# Patient Record
Sex: FEMALE | Race: WHITE | NOT HISPANIC OR LATINO | Employment: FULL TIME | URBAN - METROPOLITAN AREA
[De-identification: names, ages, dates, MRNs, and addresses within clinical notes are randomized per-mention and may not be internally consistent; named-entity substitution may affect disease eponyms.]

---
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
## 2018-08-01 ENCOUNTER — HOSPITAL ENCOUNTER (OUTPATIENT)
Dept: MRI IMAGING | Facility: HOSPITAL | Age: 18
Discharge: HOME | End: 2018-08-01
Attending: FAMILY MEDICINE | Admitting: FAMILY MEDICINE

## 2019-06-10 ENCOUNTER — CONVERSION ENCOUNTER (OUTPATIENT)
Dept: FAMILY MEDICINE CLINIC | Facility: CLINIC | Age: 19
End: 2019-06-10

## 2019-06-12 VITALS
RESPIRATION RATE: 16 BRPM | HEART RATE: 84 BPM | BODY MASS INDEX: 28.35 KG/M2 | SYSTOLIC BLOOD PRESSURE: 119 MMHG | WEIGHT: 155 LBS | OXYGEN SATURATION: 98 % | DIASTOLIC BLOOD PRESSURE: 78 MMHG
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
## 2019-09-05 ENCOUNTER — OFFICE VISIT (OUTPATIENT)
Dept: FAMILY MEDICINE CLINIC | Facility: CLINIC | Age: 19
End: 2019-09-05

## 2019-09-05 VITALS
WEIGHT: 161 LBS | HEIGHT: 62 IN | OXYGEN SATURATION: 98 % | SYSTOLIC BLOOD PRESSURE: 123 MMHG | TEMPERATURE: 97.4 F | HEART RATE: 90 BPM | BODY MASS INDEX: 29.63 KG/M2 | DIASTOLIC BLOOD PRESSURE: 81 MMHG | RESPIRATION RATE: 16 BRPM

## 2019-09-05 DIAGNOSIS — M25.562 ACUTE PAIN OF LEFT KNEE: Primary | ICD-10-CM

## 2019-09-05 PROBLEM — K76.0 FATTY LIVER: Status: ACTIVE | Noted: 2017-03-20

## 2019-09-05 PROBLEM — G43.909 HEADACHE, MIGRAINE: Status: ACTIVE | Noted: 2018-08-30

## 2019-09-05 PROCEDURE — 99213 OFFICE O/P EST LOW 20 MIN: CPT | Performed by: FAMILY MEDICINE

## 2019-09-05 RX ORDER — FEXOFENADINE HCL 180 MG/1
TABLET ORAL
COMMUNITY
Start: 2017-06-14 | End: 2021-12-09 | Stop reason: SDUPTHER

## 2019-09-05 RX ORDER — FLUTICASONE PROPIONATE 50 MCG
SPRAY, SUSPENSION (ML) NASAL
COMMUNITY
Start: 2016-01-07 | End: 2021-03-04 | Stop reason: SDUPTHER

## 2019-09-05 RX ORDER — MONTELUKAST SODIUM 10 MG/1
TABLET ORAL
COMMUNITY
Start: 2017-06-14 | End: 2021-07-09

## 2019-09-05 RX ORDER — PANTOPRAZOLE SODIUM 40 MG/1
TABLET, DELAYED RELEASE ORAL EVERY 24 HOURS
COMMUNITY
Start: 2018-12-05 | End: 2021-07-09

## 2019-09-05 NOTE — PROGRESS NOTES
Subjective   Chief Complaint   Patient presents with   • Knee Pain     Left knee injury 1 week ago     Alpesh Ocasio is a 19 y.o. female.     Patient Care Team:  Joy Meadows MD as PCP - General    She is coming in today to follow-up on her left knee injury and recent urgent care visit.  She reports that about a week ago while walking she tripped and fell directly on the left knee.  She developed instant pain and swelling as well as a big bruise.  The same day she was seen at urgent care and had x-ray done and was told everything looked fine and there was no fracture.  She has been taking some over-the-counter ibuprofen as needed and that helps.  Her knee is slowly getting better, however she still has a pretty big bruise and she is concerned and wants that to be checked.  She denies any swelling.  Her pain gets especially worse when she stays on her feet longer.  She denies any weakness, numbness, or tingling.  No new injuries reported.         The following portions of the patient's history were reviewed and updated as appropriate: allergies, current medications, past family history, past medical history, past social history, past surgical history and problem list.  Past Medical History:   Diagnosis Date   • Allergic rhinitis    • Arachnoid cyst    • Concussion    • Fatty liver    • GERD (gastroesophageal reflux disease)      Past Surgical History:   Procedure Laterality Date   • CYST REMOVAL      neck     The patient has a family history of  Family History   Problem Relation Age of Onset   • Diabetes Maternal Grandmother    • Diabetes Maternal Grandfather    • Lung cancer Paternal Grandfather      Social History     Socioeconomic History   • Marital status: Single     Spouse name: Not on file   • Number of children: Not on file   • Years of education: Not on file   • Highest education level: Not on file   Tobacco Use   • Smoking status: Never Smoker   • Smokeless tobacco: Never Used   Substance and Sexual  "Activity   • Alcohol use: Yes     Comment: occasionally   • Drug use: No   • Sexual activity: Defer       Review of Systems   Constitutional: Negative for fatigue and fever.   Musculoskeletal: Positive for arthralgias. Negative for back pain, gait problem, joint swelling, myalgias and bursitis.   Neurological: Negative for tremors, weakness and numbness.   Hematological: Negative for adenopathy. Bruises/bleeds easily.     Visit Vitals  /81 (BP Location: Left arm, Patient Position: Sitting, Cuff Size: Adult)   Pulse 90   Temp 97.4 °F (36.3 °C) (Oral)   Resp 16   Ht 157.5 cm (62\")   Wt 73 kg (161 lb)   LMP 08/24/2019 (Exact Date)   SpO2 98%   BMI 29.45 kg/m²       Current Outpatient Medications:   •  fexofenadine (ALLEGRA ALLERGY) 180 MG tablet, ALLEGRA ALLERGY 180 MG TABS, Disp: , Rfl:   •  fluticasone (FLONASE) 50 MCG/ACT nasal spray, FLUTICASONE PROPIONATE 50 MCG/ACT SUSP, Disp: , Rfl:   •  montelukast (SINGULAIR) 10 MG tablet, SINGULAIR 10 MG TABS, Disp: , Rfl:   •  pantoprazole (PROTONIX) 40 MG EC tablet, Daily., Disp: , Rfl:   •  diclofenac (VOLTAREN) 1 % gel gel, Apply 4 g topically to the appropriate area as directed 4 (Four) Times a Day As Needed (pain)., Disp: 1 tube, Rfl: 0    Objective   Physical Exam   Constitutional: She is oriented to person, place, and time. She appears well-developed and well-nourished.   HENT:   Head: Normocephalic and atraumatic.   Eyes: EOM are normal. Pupils are equal, round, and reactive to light.   Musculoskeletal: Normal range of motion. She exhibits tenderness. She exhibits no edema or deformity.   There is a resolving bruise noted on the anterior side of the left knee.  There is some palpation tenderness.  No signs of inflammation or infection.  Full range of motion in the left knee.   Neurological: She is alert and oriented to person, place, and time. She displays normal reflexes. No cranial nerve deficit. She exhibits normal muscle tone. Coordination normal.   Skin: " Skin is warm and dry.   Nursing note and vitals reviewed.      Xr Knee 1 Or 2 View Left    Result Date: 8/29/2019  Impression: No acute osseous abnormality.  Electronically Signed By-Carley Moore On:8/29/2019 8:57 PM This report was finalized on [id number] by  Carley Moore, .            Assessment/Plan   Problems Addressed this Visit        Musculoskeletal and Integument    Acute pain of left knee - Primary        I reviewed her available urgent care records.  I also reviewed her x-ray report which did not show any fractures.  She still has pretty decent size of bruise in front of the knee.  I am giving her prescription for topical anti-inflammatory to use as needed.  If she stays longer on her feet throughout the day she might benefit from some Ace bandage of the knee.  Otherwise she is to call us if any concerns.             Requested Prescriptions     Signed Prescriptions Disp Refills   • diclofenac (VOLTAREN) 1 % gel gel 1 tube 0     Sig: Apply 4 g topically to the appropriate area as directed 4 (Four) Times a Day As Needed (pain).

## 2019-11-07 ENCOUNTER — OFFICE VISIT (OUTPATIENT)
Dept: FAMILY MEDICINE CLINIC | Facility: CLINIC | Age: 19
End: 2019-11-07

## 2019-11-07 VITALS
TEMPERATURE: 98.5 F | HEIGHT: 62 IN | DIASTOLIC BLOOD PRESSURE: 74 MMHG | OXYGEN SATURATION: 98 % | BODY MASS INDEX: 29.26 KG/M2 | SYSTOLIC BLOOD PRESSURE: 123 MMHG | HEART RATE: 102 BPM | RESPIRATION RATE: 16 BRPM | WEIGHT: 159 LBS

## 2019-11-07 DIAGNOSIS — J01.00 SUBACUTE MAXILLARY SINUSITIS: Primary | ICD-10-CM

## 2019-11-07 PROCEDURE — 99213 OFFICE O/P EST LOW 20 MIN: CPT | Performed by: FAMILY MEDICINE

## 2019-11-07 RX ORDER — BENZONATATE 200 MG/1
200 CAPSULE ORAL 3 TIMES DAILY PRN
Qty: 30 CAPSULE | Refills: 0 | Status: SHIPPED | OUTPATIENT
Start: 2019-11-07 | End: 2019-11-17

## 2019-11-07 RX ORDER — NORGESTIMATE AND ETHINYL ESTRADIOL 0.25-0.035
1 KIT ORAL DAILY
Refills: 12 | COMMUNITY
Start: 2019-10-20 | End: 2021-05-14

## 2019-11-07 RX ORDER — AZITHROMYCIN 250 MG/1
250 TABLET, FILM COATED ORAL DAILY
Qty: 6 TABLET | Refills: 0 | Status: SHIPPED | OUTPATIENT
Start: 2019-11-07 | End: 2019-11-12

## 2019-11-07 NOTE — PROGRESS NOTES
Subjective   Chief Complaint   Patient presents with   • Cough   • Nasal Congestion     Alpesh Ocasio is a 19 y.o. female.     Patient Care Team:  Joy Meadows MD as PCP - General    She is coming in today to discuss her respiratory issues.  She has been sick for 2 weeks with congestion and postnasal drainage.  She has been having a lot of cough, but that mostly is dry.  She is feeling more symptoms in her throat now.  She also feels some pressure in her sinuses.  She however denies any difficulty breathing, wheezing, or shortness of breath.  She denies any nausea, vomiting, or diarrhea.         The following portions of the patient's history were reviewed and updated as appropriate: allergies, current medications, past family history, past medical history, past social history, past surgical history and problem list.  Past Medical History:   Diagnosis Date   • Allergic rhinitis    • Arachnoid cyst    • Concussion    • Fatty liver    • GERD (gastroesophageal reflux disease)      Past Surgical History:   Procedure Laterality Date   • CYST REMOVAL      neck     The patient has a family history of  Family History   Problem Relation Age of Onset   • Diabetes Maternal Grandmother    • Diabetes Maternal Grandfather    • Lung cancer Paternal Grandfather      Social History     Socioeconomic History   • Marital status: Single     Spouse name: Not on file   • Number of children: Not on file   • Years of education: Not on file   • Highest education level: Not on file   Tobacco Use   • Smoking status: Never Smoker   • Smokeless tobacco: Never Used   Substance and Sexual Activity   • Alcohol use: Yes     Comment: occasionally   • Drug use: No   • Sexual activity: Defer       Review of Systems   Constitutional: Negative for activity change, appetite change, chills and fever.   HENT: Positive for congestion, postnasal drip, sinus pressure and sore throat. Negative for ear pain, swollen glands and voice change.    Respiratory:  "Positive for cough. Negative for choking, chest tightness, shortness of breath, wheezing and stridor.    Cardiovascular: Negative for chest pain.   Skin: Negative for dry skin and rash.     Visit Vitals  /74 (BP Location: Left arm, Patient Position: Sitting, Cuff Size: Large Adult)   Pulse 102   Temp 98.5 °F (36.9 °C) (Oral)   Resp 16   Ht 157.5 cm (62\")   Wt 72.1 kg (159 lb)   SpO2 98%   BMI 29.08 kg/m²       Current Outpatient Medications:   •  azithromycin (ZITHROMAX) 250 MG tablet, Take 1 tablet by mouth Daily for 5 days. Take 2 tablets the first day, then 1 tablet daily for 4 days., Disp: 6 tablet, Rfl: 0  •  benzonatate (TESSALON) 200 MG capsule, Take 1 capsule by mouth 3 (Three) Times a Day As Needed for Cough for up to 10 days., Disp: 30 capsule, Rfl: 0  •  diclofenac (VOLTAREN) 1 % gel gel, Apply 4 g topically to the appropriate area as directed 4 (Four) Times a Day As Needed (pain)., Disp: 1 tube, Rfl: 0  •  fexofenadine (ALLEGRA ALLERGY) 180 MG tablet, ALLEGRA ALLERGY 180 MG TABS, Disp: , Rfl:   •  fluticasone (FLONASE) 50 MCG/ACT nasal spray, FLUTICASONE PROPIONATE 50 MCG/ACT SUSP, Disp: , Rfl:   •  montelukast (SINGULAIR) 10 MG tablet, SINGULAIR 10 MG TABS, Disp: , Rfl:   •  norgestimate-ethinyl estradiol (ORTHO-CYCLEN) 0.25-35 MG-MCG per tablet, Take 1 tablet by mouth Daily. 200001, Disp: , Rfl: 12  •  pantoprazole (PROTONIX) 40 MG EC tablet, Daily., Disp: , Rfl:     Objective   Physical Exam   Constitutional: She appears well-developed and well-nourished. No distress.   HENT:   Head: Normocephalic and atraumatic.   Right Ear: External ear normal.   Left Ear: External ear normal.   Mouth/Throat: Oropharynx is clear and moist. No oropharyngeal exudate.   Palpation tenderness to both maxillary sinuses noted. Congestion noted.   Eyes: Conjunctivae and EOM are normal. Pupils are equal, round, and reactive to light.   Neck: Normal range of motion. Neck supple.   Cardiovascular: Normal rate, regular " rhythm, normal heart sounds and intact distal pulses.   Pulmonary/Chest: Effort normal and breath sounds normal. No respiratory distress. She has no wheezes. She has no rales.   Skin: Skin is warm and dry. No rash noted.   Nursing note and vitals reviewed.               Assessment/Plan   Problems Addressed this Visit        Respiratory    Subacute maxillary sinusitis - Primary        I will start her on antibiotic and I also gave her prescription for the cough medicine.  Symptomatic treatment in general was also discussed.  She is to call us back if she is not improving.             Requested Prescriptions     Signed Prescriptions Disp Refills   • benzonatate (TESSALON) 200 MG capsule 30 capsule 0     Sig: Take 1 capsule by mouth 3 (Three) Times a Day As Needed for Cough for up to 10 days.   • azithromycin (ZITHROMAX) 250 MG tablet 6 tablet 0     Sig: Take 1 tablet by mouth Daily for 5 days. Take 2 tablets the first day, then 1 tablet daily for 4 days.

## 2020-06-10 ENCOUNTER — OFFICE VISIT (OUTPATIENT)
Dept: FAMILY MEDICINE CLINIC | Facility: CLINIC | Age: 20
End: 2020-06-10

## 2020-06-10 VITALS
OXYGEN SATURATION: 97 % | HEART RATE: 96 BPM | DIASTOLIC BLOOD PRESSURE: 71 MMHG | TEMPERATURE: 97.8 F | SYSTOLIC BLOOD PRESSURE: 111 MMHG | RESPIRATION RATE: 16 BRPM | BODY MASS INDEX: 29.08 KG/M2 | HEIGHT: 62 IN | WEIGHT: 158 LBS

## 2020-06-10 DIAGNOSIS — L55.0 SUNBURN OF FIRST DEGREE: ICD-10-CM

## 2020-06-10 DIAGNOSIS — L98.9 SKIN LESION: Primary | ICD-10-CM

## 2020-06-10 PROCEDURE — 99213 OFFICE O/P EST LOW 20 MIN: CPT | Performed by: FAMILY MEDICINE

## 2020-06-10 NOTE — PROGRESS NOTES
Subjective   Chief Complaint   Patient presents with   • skin eval     Alpesh Ocasio is a 20 y.o. female.     Patient Care Team:  Joy Meadows MD as PCP - General    She is coming in today due to some skin concerns.  She reports that for quite some time she is noted some freckles on her back especially but also on her upper chest.  She thinks that some of those falls are fairly new and she is not really sure if she should or should not be concerned about that.  She denies any itchiness or any bleeding from any of those areas.  She does not go to tanning beds and never has.  She recently, few days ago spent some time outside in even though she used sunblock she got a sunburn on her back.  She denies any other concerns.       The following portions of the patient's history were reviewed and updated as appropriate: allergies, current medications, past family history, past medical history, past social history, past surgical history and problem list.  Past Medical History:   Diagnosis Date   • Allergic rhinitis    • Arachnoid cyst    • Concussion    • Fatty liver    • GERD (gastroesophageal reflux disease)      Past Surgical History:   Procedure Laterality Date   • CYST REMOVAL      neck     The patient has a family history of  Family History   Problem Relation Age of Onset   • Diabetes Maternal Grandmother    • Diabetes Maternal Grandfather    • Lung cancer Paternal Grandfather      Social History     Socioeconomic History   • Marital status: Single     Spouse name: Not on file   • Number of children: Not on file   • Years of education: Not on file   • Highest education level: Not on file   Tobacco Use   • Smoking status: Never Smoker   • Smokeless tobacco: Never Used   Substance and Sexual Activity   • Alcohol use: Yes     Comment: occasionally   • Drug use: No   • Sexual activity: Defer       Review of Systems   Constitutional: Negative for chills, fatigue and fever.   Skin: Positive for skin lesions. Negative for  "color change, dry skin, pallor, rash and bruise.   Hematological: Negative for adenopathy. Does not bruise/bleed easily.     Visit Vitals  /71 (BP Location: Left arm, Patient Position: Sitting, Cuff Size: Large Adult)   Pulse 96   Temp 97.8 °F (36.6 °C) (Temporal)   Resp 16   Ht 157.5 cm (62\")   Wt 71.7 kg (158 lb)   SpO2 97%   BMI 28.90 kg/m²       Current Outpatient Medications:   •  diclofenac (VOLTAREN) 1 % gel gel, Apply 4 g topically to the appropriate area as directed 4 (Four) Times a Day As Needed (pain)., Disp: 1 tube, Rfl: 0  •  fexofenadine (ALLEGRA ALLERGY) 180 MG tablet, ALLEGRA ALLERGY 180 MG TABS, Disp: , Rfl:   •  fluticasone (FLONASE) 50 MCG/ACT nasal spray, FLUTICASONE PROPIONATE 50 MCG/ACT SUSP, Disp: , Rfl:   •  montelukast (SINGULAIR) 10 MG tablet, SINGULAIR 10 MG TABS, Disp: , Rfl:   •  norgestimate-ethinyl estradiol (ORTHO-CYCLEN) 0.25-35 MG-MCG per tablet, Take 1 tablet by mouth Daily. 200001, Disp: , Rfl: 12  •  pantoprazole (PROTONIX) 40 MG EC tablet, Daily., Disp: , Rfl:     Objective   Physical Exam   Constitutional: She is oriented to person, place, and time. She appears well-developed and well-nourished. No distress.   HENT:   Head: Normocephalic and atraumatic.   Eyes: Pupils are equal, round, and reactive to light. Conjunctivae and EOM are normal.   Neck: Normal range of motion. Neck supple.   Pulmonary/Chest: Effort normal. No respiratory distress.   Musculoskeletal: Normal range of motion.   Neurological: She is alert and oriented to person, place, and time. No cranial nerve deficit.   Skin: She is not diaphoretic.   There are several small and flat freckles noted especially on her back, but some on the upper chest.  All of them her benign appearance no concerning signs were noted.  There is a redness of the skin noted on her back consistent with first degree of sunburn.  No blisters.   Psychiatric: She has a normal mood and affect. Judgment and thought content normal.         "        Assessment/Plan   Problems Addressed this Visit        Musculoskeletal and Integument    Skin lesion - Primary    Sunburn of first degree        She has several freckles noted on her back and some on her upper chest.  No concerning signs.  She also has a skin changes consistent with first-degree sunburn.  Skin care was discussed and importance of using sunblock for the skin protection against sun damage was also discussed and strongly encouraged.             Requested Prescriptions      No prescriptions requested or ordered in this encounter

## 2020-11-05 ENCOUNTER — CLINICAL SUPPORT (OUTPATIENT)
Dept: FAMILY MEDICINE CLINIC | Facility: CLINIC | Age: 20
End: 2020-11-05

## 2020-11-05 DIAGNOSIS — Z23 NEED FOR IMMUNIZATION AGAINST INFLUENZA: Primary | ICD-10-CM

## 2020-11-05 PROCEDURE — 90686 IIV4 VACC NO PRSV 0.5 ML IM: CPT | Performed by: FAMILY MEDICINE

## 2020-11-05 PROCEDURE — 90471 IMMUNIZATION ADMIN: CPT | Performed by: FAMILY MEDICINE

## 2021-02-05 ENCOUNTER — OFFICE VISIT (OUTPATIENT)
Dept: FAMILY MEDICINE CLINIC | Facility: CLINIC | Age: 21
End: 2021-02-05

## 2021-02-05 ENCOUNTER — HOSPITAL ENCOUNTER (OUTPATIENT)
Dept: GENERAL RADIOLOGY | Facility: HOSPITAL | Age: 21
Discharge: HOME | End: 2021-02-05
Admitting: FAMILY MEDICINE

## 2021-02-05 VITALS
WEIGHT: 168 LBS | TEMPERATURE: 98.2 F | BODY MASS INDEX: 30.91 KG/M2 | RESPIRATION RATE: 16 BRPM | HEART RATE: 105 BPM | OXYGEN SATURATION: 98 % | DIASTOLIC BLOOD PRESSURE: 79 MMHG | HEIGHT: 62 IN | SYSTOLIC BLOOD PRESSURE: 117 MMHG

## 2021-02-05 DIAGNOSIS — R09.1 PLEURISY: Primary | ICD-10-CM

## 2021-02-05 PROCEDURE — 99213 OFFICE O/P EST LOW 20 MIN: CPT | Performed by: FAMILY MEDICINE

## 2021-02-05 PROCEDURE — 71046 X-RAY EXAM CHEST 2 VIEWS: CPT

## 2021-02-05 NOTE — PROGRESS NOTES
Subjective   Chief Complaint   Patient presents with   • Left upper back pain for few days     Alpesh Ocasio is a 20 y.o. female.     Patient Care Team:  Joy Meadows MD as PCP - General    She is coming in today due to pain in the left upper back area.  She tells me that it started with some discomfort in the shoulder and then shifted in the left upper back.  The pain gets worse with taking a deep breath or coughing.  However she does not really feel bad and she denies any difficulty breathing, chest pain, chest tightness, fever, or body aches.  She denies any injury.  She has tried to take some ibuprofen as needed and that seems to be helping.       The following portions of the patient's history were reviewed and updated as appropriate: allergies, current medications, past family history, past medical history, past social history, past surgical history and problem list.  Past Medical History:   Diagnosis Date   • Allergic rhinitis    • Arachnoid cyst    • Concussion    • Fatty liver    • GERD (gastroesophageal reflux disease)      Past Surgical History:   Procedure Laterality Date   • CYST REMOVAL      neck     The patient has a family history of  Family History   Problem Relation Age of Onset   • Diabetes Maternal Grandmother    • Diabetes Maternal Grandfather    • Lung cancer Paternal Grandfather      Social History     Socioeconomic History   • Marital status: Single     Spouse name: Not on file   • Number of children: Not on file   • Years of education: Not on file   • Highest education level: Not on file   Tobacco Use   • Smoking status: Never Smoker   • Smokeless tobacco: Never Used   Substance and Sexual Activity   • Alcohol use: Yes     Comment: occasionally   • Drug use: No   • Sexual activity: Defer       Review of Systems   Constitutional: Negative for activity change, appetite change, chills, fatigue and fever.   HENT: Negative for congestion, ear pain, postnasal drip, sinus pressure, sore throat  "and swollen glands.    Respiratory: Negative for cough, choking, chest tightness, shortness of breath, wheezing and stridor.    Cardiovascular: Negative for chest pain.   Musculoskeletal: Positive for back pain.   Skin: Negative for dry skin and rash.     Visit Vitals  /79 (BP Location: Right arm, Patient Position: Sitting, Cuff Size: Adult)   Pulse 105   Temp 98.2 °F (36.8 °C) (Temporal)   Resp 16   Ht 157.5 cm (62\")   Wt 76.2 kg (168 lb)   SpO2 98%   BMI 30.73 kg/m²       Current Outpatient Medications:   •  diclofenac (VOLTAREN) 1 % gel gel, Apply 4 g topically to the appropriate area as directed 4 (Four) Times a Day As Needed (pain)., Disp: 1 tube, Rfl: 0  •  fexofenadine (ALLEGRA ALLERGY) 180 MG tablet, ALLEGRA ALLERGY 180 MG TABS, Disp: , Rfl:   •  fluticasone (FLONASE) 50 MCG/ACT nasal spray, FLUTICASONE PROPIONATE 50 MCG/ACT SUSP, Disp: , Rfl:   •  montelukast (SINGULAIR) 10 MG tablet, SINGULAIR 10 MG TABS, Disp: , Rfl:   •  norgestimate-ethinyl estradiol (ORTHO-CYCLEN) 0.25-35 MG-MCG per tablet, Take 1 tablet by mouth Daily. 200001, Disp: , Rfl: 12  •  pantoprazole (PROTONIX) 40 MG EC tablet, Daily., Disp: , Rfl:     Objective   Physical Exam  Vitals signs and nursing note reviewed.   Constitutional:       Appearance: Normal appearance. She is well-developed.   HENT:      Head: Normocephalic and atraumatic.   Cardiovascular:      Rate and Rhythm: Normal rate and regular rhythm.      Heart sounds: Normal heart sounds. No murmur. No gallop.    Pulmonary:      Effort: Pulmonary effort is normal. No respiratory distress.      Breath sounds: Normal breath sounds. No wheezing, rhonchi or rales.   Chest:      Chest wall: No tenderness.   Neurological:      General: No focal deficit present.      Mental Status: She is alert and oriented to person, place, and time. Mental status is at baseline.         Xr Chest 2 View    Result Date: 2/5/2021  Impression: No acute cardiopulmonary process.  Electronically " Signed By-Marin Jimenez MD On:2/5/2021 1:01 PM This report was finalized on [id number] by  Marin Jimenez MD.        Assessment/Plan   Diagnoses and all orders for this visit:    1. Pleurisy (Primary)  -     XR Chest 2 View      I suspect that her symptoms are due to pleurisy.  Chest x-ray was done today and it did not show any acute changes.  I advised for her to start taking over-the-counter Aleve twice a day on a regular basis for the next 5-7 days and call us back if her symptoms do not resolve.      Return if symptoms worsen or fail to improve, for Recheck.    Requested Prescriptions      No prescriptions requested or ordered in this encounter

## 2021-03-04 ENCOUNTER — OFFICE VISIT (OUTPATIENT)
Dept: FAMILY MEDICINE CLINIC | Facility: CLINIC | Age: 21
End: 2021-03-04

## 2021-03-04 VITALS
OXYGEN SATURATION: 99 % | HEART RATE: 97 BPM | WEIGHT: 169 LBS | HEIGHT: 62 IN | TEMPERATURE: 97.8 F | BODY MASS INDEX: 31.1 KG/M2 | SYSTOLIC BLOOD PRESSURE: 120 MMHG | RESPIRATION RATE: 16 BRPM | DIASTOLIC BLOOD PRESSURE: 87 MMHG

## 2021-03-04 DIAGNOSIS — J30.1 SEASONAL ALLERGIC RHINITIS DUE TO POLLEN: ICD-10-CM

## 2021-03-04 DIAGNOSIS — J06.9 ACUTE URI: Primary | ICD-10-CM

## 2021-03-04 PROCEDURE — U0003 INFECTIOUS AGENT DETECTION BY NUCLEIC ACID (DNA OR RNA); SEVERE ACUTE RESPIRATORY SYNDROME CORONAVIRUS 2 (SARS-COV-2) (CORONAVIRUS DISEASE [COVID-19]), AMPLIFIED PROBE TECHNIQUE, MAKING USE OF HIGH THROUGHPUT TECHNOLOGIES AS DESCRIBED BY CMS-2020-01-R: HCPCS | Performed by: FAMILY MEDICINE

## 2021-03-04 PROCEDURE — 99213 OFFICE O/P EST LOW 20 MIN: CPT | Performed by: FAMILY MEDICINE

## 2021-03-04 RX ORDER — FLUTICASONE PROPIONATE 50 MCG
2 SPRAY, SUSPENSION (ML) NASAL DAILY
Qty: 16 G | Refills: 0 | Status: SHIPPED | OUTPATIENT
Start: 2021-03-04 | End: 2021-03-12

## 2021-03-04 RX ORDER — AZITHROMYCIN 250 MG/1
250 TABLET, FILM COATED ORAL DAILY
Qty: 6 TABLET | Refills: 0 | Status: SHIPPED | OUTPATIENT
Start: 2021-03-04 | End: 2021-03-09

## 2021-03-04 NOTE — PROGRESS NOTES
Subjective   Chief Complaint   Patient presents with   • Sinusitis   • URI     3 days     Alpesh Ocasio is a 20 y.o. female.     Patient Care Team:  Joy Meadows MD as PCP - General    Been in today as she has not been feeling well for the last 3 days.  She has been having some congestion and postnasal drainage and also some pressure in her sinuses. She has some mild cough, however she denies any difficulty breathing, fever, body aches, or chest pain.  She works as a hairstylist.  She is not aware of being exposed to anybody with Covid.  Her current symptoms feel to her like her regular sinus infection or possibly allergies.       The following portions of the patient's history were reviewed and updated as appropriate: allergies, current medications, past family history, past medical history, past social history, past surgical history and problem list.  Past Medical History:   Diagnosis Date   • Allergic rhinitis    • Arachnoid cyst    • Concussion    • Fatty liver    • GERD (gastroesophageal reflux disease)      Past Surgical History:   Procedure Laterality Date   • CYST REMOVAL      neck     The patient has a family history of  Family History   Problem Relation Age of Onset   • Diabetes Maternal Grandmother    • Diabetes Maternal Grandfather    • Lung cancer Paternal Grandfather      Social History     Socioeconomic History   • Marital status: Single     Spouse name: Not on file   • Number of children: Not on file   • Years of education: Not on file   • Highest education level: Not on file   Tobacco Use   • Smoking status: Never Smoker   • Smokeless tobacco: Never Used   Substance and Sexual Activity   • Alcohol use: Yes     Comment: occasionally   • Drug use: No   • Sexual activity: Defer       Review of Systems   Constitutional: Negative for activity change, appetite change, chills and fever.   HENT: Positive for congestion, postnasal drip and sore throat. Negative for ear pain, sinus pressure and swollen  "glands.    Respiratory: Positive for cough. Negative for choking, chest tightness, shortness of breath, wheezing and stridor.    Cardiovascular: Negative for chest pain.   Skin: Negative for dry skin and rash.   Allergic/Immunologic: Positive for environmental allergies.     Visit Vitals  /87 (BP Location: Left arm, Patient Position: Sitting, Cuff Size: Adult)   Pulse 97   Temp 97.8 °F (36.6 °C) (Temporal)   Resp 16   Ht 157.5 cm (62\")   Wt 76.7 kg (169 lb)   SpO2 99%   BMI 30.91 kg/m²       Current Outpatient Medications:   •  azithromycin (ZITHROMAX) 250 MG tablet, Take 1 tablet by mouth Daily for 5 days. Take 2 tablets the first day, then 1 tablet daily for 4 days., Disp: 6 tablet, Rfl: 0  •  diclofenac (VOLTAREN) 1 % gel gel, Apply 4 g topically to the appropriate area as directed 4 (Four) Times a Day As Needed (pain)., Disp: 1 tube, Rfl: 0  •  fexofenadine (ALLEGRA ALLERGY) 180 MG tablet, ALLEGRA ALLERGY 180 MG TABS, Disp: , Rfl:   •  fluticasone (FLONASE) 50 MCG/ACT nasal spray, 2 sprays into the nostril(s) as directed by provider Daily., Disp: 16 g, Rfl: 0  •  montelukast (SINGULAIR) 10 MG tablet, SINGULAIR 10 MG TABS, Disp: , Rfl:   •  norgestimate-ethinyl estradiol (ORTHO-CYCLEN) 0.25-35 MG-MCG per tablet, Take 1 tablet by mouth Daily. 200001, Disp: , Rfl: 12  •  pantoprazole (PROTONIX) 40 MG EC tablet, Daily., Disp: , Rfl:     Objective   Physical Exam  Vitals signs and nursing note reviewed.   Constitutional:       General: She is not in acute distress.     Appearance: She is well-developed.      Comments: Some congestion noted.   HENT:      Head: Normocephalic and atraumatic.      Right Ear: External ear normal.      Left Ear: External ear normal.      Mouth/Throat:      Pharynx: No oropharyngeal exudate.   Eyes:      Conjunctiva/sclera: Conjunctivae normal.      Pupils: Pupils are equal, round, and reactive to light.   Neck:      Musculoskeletal: Normal range of motion and neck supple. "   Cardiovascular:      Rate and Rhythm: Normal rate and regular rhythm.      Heart sounds: Normal heart sounds.   Pulmonary:      Effort: Pulmonary effort is normal. No respiratory distress.      Breath sounds: Normal breath sounds. No wheezing or rales.   Skin:     General: Skin is warm and dry.      Findings: No rash.         Xr Chest 2 View    Result Date: 2021  Impression: No acute cardiopulmonary process.  Electronically Signed By-Marin Jimenez MD On:2021 1:01 PM This report was finalized on [id number] by  Marin Jimenez MD.      No visits with results within 7 Day(s) from this visit.   Latest known visit with results is:   No results found for any previous visit.       Assessment/Plan   Diagnoses and all orders for this visit:    1. Acute URI (Primary)  -     azithromycin (ZITHROMAX) 250 MG tablet; Take 1 tablet by mouth Daily for 5 days. Take 2 tablets the first day, then 1 tablet daily for 4 days.  Dispense: 6 tablet; Refill: 0  -     fluticasone (FLONASE) 50 MCG/ACT nasal spray; 2 sprays into the nostril(s) as directed by provider Daily.  Dispense: 16 g; Refill: 0  -     COVID-19,[facility] ROUTINE, NP/OP SWAB IN TRANSPORT MEDIA OR ESWAB 72 HR TAT - Swab, Anterior nasal; Future  -     COVID-19,[facility] ROUTINE, NP/OP SWAB IN TRANSPORT MEDIA OR ESWAB 72 HR TAT - Swab, Anterior nasal  -     QUESTIONNAIRE SERIES    2. Seasonal allergic rhinitis due to pollen            Return if symptoms worsen or fail to improve, for Medicare Wellness.    Requested Prescriptions     Signed Prescriptions Disp Refills   • azithromycin (ZITHROMAX) 250 MG tablet 6 tablet 0     Sig: Take 1 tablet by mouth Daily for 5 days. Take 2 tablets the first day, then 1 tablet daily for 4 days.   • fluticasone (FLONASE) 50 MCG/ACT nasal spray 16 g 0     Si sprays into the nostril(s) as directed by provider Daily.

## 2021-03-05 LAB — SARS-COV-2 RNA RESP QL NAA+PROBE: NOT DETECTED

## 2021-03-12 DIAGNOSIS — J06.9 ACUTE URI: ICD-10-CM

## 2021-03-12 RX ORDER — FLUTICASONE PROPIONATE 50 MCG
SPRAY, SUSPENSION (ML) NASAL
Qty: 16 G | Refills: 0 | Status: SHIPPED | OUTPATIENT
Start: 2021-03-12 | End: 2021-09-05

## 2021-05-14 ENCOUNTER — OFFICE VISIT (OUTPATIENT)
Dept: FAMILY MEDICINE CLINIC | Facility: CLINIC | Age: 21
End: 2021-05-14

## 2021-05-14 VITALS
WEIGHT: 165.8 LBS | OXYGEN SATURATION: 98 % | HEIGHT: 62 IN | SYSTOLIC BLOOD PRESSURE: 111 MMHG | BODY MASS INDEX: 30.51 KG/M2 | DIASTOLIC BLOOD PRESSURE: 78 MMHG | TEMPERATURE: 97.5 F | HEART RATE: 103 BPM

## 2021-05-14 DIAGNOSIS — J06.9 ACUTE URI: Primary | ICD-10-CM

## 2021-05-14 PROCEDURE — 99213 OFFICE O/P EST LOW 20 MIN: CPT | Performed by: FAMILY MEDICINE

## 2021-05-14 RX ORDER — AZITHROMYCIN 250 MG/1
250 TABLET, FILM COATED ORAL DAILY
Qty: 6 TABLET | Refills: 0 | Status: SHIPPED | OUTPATIENT
Start: 2021-05-14 | End: 2021-05-19

## 2021-05-14 RX ORDER — LEVONORGESTREL AND ETHINYL ESTRADIOL 0.1-0.02MG
1 KIT ORAL DAILY
COMMUNITY
Start: 2021-04-27 | End: 2022-12-05

## 2021-05-14 NOTE — PROGRESS NOTES
Subjective   Chief Complaint   Patient presents with   • Cough   • NAKITA Ocasio is a 20 y.o. female.     Patient Care Team:  Joy Meadows MD as PCP - General    He is coming in today as she has not been feeling well for the last 5 days or so.  She has noted increased congestion and pressure in her sinuses and postnasal drainage.  Lately she also noted some colorful drainage from her nasal passages.  She also has some mild cough.  She has tried over-the-counter Stahist and even using DayQuil and NyQuil.  She also uses Flonase.  She has issues with seasonal allergies.  She denies any fever or body aches or any difficulty breathing.  She already completed her COVID-19 vaccination series.       The following portions of the patient's history were reviewed and updated as appropriate: allergies, current medications, past family history, past medical history, past social history, past surgical history and problem list.  Past Medical History:   Diagnosis Date   • Allergic rhinitis    • Arachnoid cyst    • Concussion    • Fatty liver    • GERD (gastroesophageal reflux disease)      Past Surgical History:   Procedure Laterality Date   • CYST REMOVAL      neck     The patient has a family history of  Family History   Problem Relation Age of Onset   • Diabetes Maternal Grandmother    • Diabetes Maternal Grandfather    • Lung cancer Paternal Grandfather      Social History     Socioeconomic History   • Marital status: Single     Spouse name: Not on file   • Number of children: Not on file   • Years of education: Not on file   • Highest education level: Not on file   Tobacco Use   • Smoking status: Never Smoker   • Smokeless tobacco: Never Used   Substance and Sexual Activity   • Alcohol use: Yes     Comment: occasionally   • Drug use: No   • Sexual activity: Defer       Review of Systems   Constitutional: Negative for activity change, appetite change, chills and fever.   HENT: Positive for congestion, postnasal drip  "and sinus pressure. Negative for ear pain, sore throat and swollen glands.    Respiratory: Positive for cough. Negative for choking, chest tightness, shortness of breath, wheezing and stridor.    Cardiovascular: Negative for chest pain.   Skin: Negative for dry skin and rash.   Allergic/Immunologic: Positive for environmental allergies.     Visit Vitals  /78   Pulse 103   Temp 97.5 °F (36.4 °C) (Temporal)   Ht 157.5 cm (62\")   Wt 75.2 kg (165 lb 12.8 oz)   SpO2 98%   BMI 30.33 kg/m²       Current Outpatient Medications:   •  diclofenac (VOLTAREN) 1 % gel gel, Apply 4 g topically to the appropriate area as directed 4 (Four) Times a Day As Needed (pain)., Disp: 1 tube, Rfl: 0  •  fexofenadine (ALLEGRA ALLERGY) 180 MG tablet, ALLEGRA ALLERGY 180 MG TABS, Disp: , Rfl:   •  fluticasone (FLONASE) 50 MCG/ACT nasal spray, INHALE 2 SPRAYS IN EACH NOSTRIL ONE TIME A DAY , Disp: 16 g, Rfl: 0  •  montelukast (SINGULAIR) 10 MG tablet, SINGULAIR 10 MG TABS, Disp: , Rfl:   •  pantoprazole (PROTONIX) 40 MG EC tablet, Daily., Disp: , Rfl:   •  Vienva 0.1-20 MG-MCG per tablet, Take 1 tablet by mouth Daily., Disp: , Rfl:   •  azithromycin (ZITHROMAX) 250 MG tablet, Take 1 tablet by mouth Daily for 5 days. Take 2 tablets the first day, then 1 tablet daily for 4 days., Disp: 6 tablet, Rfl: 0    Objective   Physical Exam  Vitals and nursing note reviewed.   Constitutional:       General: She is not in acute distress.     Appearance: She is well-developed.   HENT:      Head: Normocephalic and atraumatic.      Right Ear: External ear normal.      Left Ear: External ear normal.      Ears:      Comments: Some congestion noted, there is a postnasal drainage noted.  There is some palpation tenderness over maxillary sinuses bilaterally.     Mouth/Throat:      Pharynx: No oropharyngeal exudate.   Eyes:      Conjunctiva/sclera: Conjunctivae normal.      Pupils: Pupils are equal, round, and reactive to light.   Cardiovascular:      Rate and " Rhythm: Normal rate and regular rhythm.      Heart sounds: Normal heart sounds.   Pulmonary:      Effort: Pulmonary effort is normal. No respiratory distress.      Breath sounds: Normal breath sounds. No wheezing or rales.   Musculoskeletal:      Cervical back: Normal range of motion and neck supple.   Skin:     General: Skin is warm and dry.      Findings: No rash.         Assessment/Plan   Diagnoses and all orders for this visit:    1. Acute URI (Primary)  -     azithromycin (ZITHROMAX) 250 MG tablet; Take 1 tablet by mouth Daily for 5 days. Take 2 tablets the first day, then 1 tablet daily for 4 days.  Dispense: 6 tablet; Refill: 0        Return if symptoms worsen or fail to improve, for Recheck.    Requested Prescriptions     Signed Prescriptions Disp Refills   • azithromycin (ZITHROMAX) 250 MG tablet 6 tablet 0     Sig: Take 1 tablet by mouth Daily for 5 days. Take 2 tablets the first day, then 1 tablet daily for 4 days.

## 2021-07-09 ENCOUNTER — OFFICE VISIT (OUTPATIENT)
Dept: FAMILY MEDICINE CLINIC | Facility: CLINIC | Age: 21
End: 2021-07-09

## 2021-07-09 VITALS
TEMPERATURE: 97.3 F | SYSTOLIC BLOOD PRESSURE: 130 MMHG | DIASTOLIC BLOOD PRESSURE: 85 MMHG | WEIGHT: 167.4 LBS | HEIGHT: 62 IN | BODY MASS INDEX: 30.8 KG/M2 | HEART RATE: 98 BPM | OXYGEN SATURATION: 98 %

## 2021-07-09 DIAGNOSIS — R09.89 TONSIL SYMPTOM: ICD-10-CM

## 2021-07-09 DIAGNOSIS — J30.1 SEASONAL ALLERGIC RHINITIS DUE TO POLLEN: Primary | ICD-10-CM

## 2021-07-09 PROCEDURE — 99213 OFFICE O/P EST LOW 20 MIN: CPT | Performed by: FAMILY MEDICINE

## 2021-07-09 RX ORDER — OMEPRAZOLE 20 MG/1
20 CAPSULE, DELAYED RELEASE ORAL DAILY
COMMUNITY

## 2021-07-09 NOTE — PROGRESS NOTES
Subjective   Chief Complaint   Patient presents with   • Issues with tonsils   • Congestion and eye symptoms     Alpesh Ocasio is a 21 y.o. female.     Patient Care Team:  Joy Meadows MD as PCP - General    She is coming in today as she would like to talk about some of her upper respiratory symptoms.  She tells me that for the last week or so she has been noticing some bad taste in the back of her throat and she thinks that this is coming from her tonsils and she is concerned that she might have tonsillar stones as she had these in the past.  She denies any sore throat or discomfort upon swallowing, however she would like to be checked.  She also has been having issues with allergies and congestion and sometimes even eye symptoms.  She wonders what she can take for it.  She is already fully vaccinated against COVID-19.  She denies any breathing problems or fever.       The following portions of the patient's history were reviewed and updated as appropriate: allergies, current medications, past family history, past medical history, past social history, past surgical history and problem list.  Past Medical History:   Diagnosis Date   • Allergic rhinitis    • Arachnoid cyst    • Concussion    • Fatty liver    • GERD (gastroesophageal reflux disease)      Past Surgical History:   Procedure Laterality Date   • CYST REMOVAL      neck     The patient has a family history of  Family History   Problem Relation Age of Onset   • Diabetes Maternal Grandmother    • Diabetes Maternal Grandfather    • Lung cancer Paternal Grandfather      Social History     Socioeconomic History   • Marital status: Single     Spouse name: Not on file   • Number of children: Not on file   • Years of education: Not on file   • Highest education level: Not on file   Tobacco Use   • Smoking status: Never Smoker   • Smokeless tobacco: Never Used   Substance and Sexual Activity   • Alcohol use: Yes     Comment: occasionally   • Drug use: No   •  "Sexual activity: Defer       Review of Systems   Constitutional: Negative for activity change, appetite change, chills and fever.   HENT: Positive for congestion. Negative for ear pain, postnasal drip, sinus pressure, sore throat and swollen glands.    Eyes: Positive for discharge.   Respiratory: Negative for cough, choking, chest tightness, shortness of breath, wheezing and stridor.    Cardiovascular: Negative for chest pain.   Skin: Negative for dry skin and rash.   Allergic/Immunologic: Positive for environmental allergies.     Visit Vitals  /85   Pulse 98   Temp 97.3 °F (36.3 °C) (Temporal)   Ht 157.5 cm (62\")   Wt 75.9 kg (167 lb 6.4 oz)   SpO2 98%   BMI 30.62 kg/m²       Current Outpatient Medications:   •  fexofenadine (ALLEGRA ALLERGY) 180 MG tablet, ALLEGRA ALLERGY 180 MG TABS, Disp: , Rfl:   •  fluticasone (FLONASE) 50 MCG/ACT nasal spray, INHALE 2 SPRAYS IN EACH NOSTRIL ONE TIME A DAY , Disp: 16 g, Rfl: 0  •  omeprazole (priLOSEC) 20 MG capsule, Take 20 mg by mouth Daily., Disp: , Rfl:   •  Vienva 0.1-20 MG-MCG per tablet, Take 1 tablet by mouth Daily., Disp: , Rfl:     Objective   Physical Exam  Constitutional:       General: She is not in acute distress.     Appearance: Normal appearance. She is well-developed. She is not ill-appearing or diaphoretic.      Comments: Patient is in no distress, patient has normal voice and speech.  Normal respiratory effort.   HENT:      Head: Normocephalic and atraumatic.      Ears:      Comments: There is some inflammation and erythematous changes noted in the pharyngeal area and on the tonsils.     Nose: No congestion or rhinorrhea.   Pulmonary:      Effort: Pulmonary effort is normal.   Musculoskeletal:      Cervical back: Normal range of motion and neck supple.   Neurological:      General: No focal deficit present.      Mental Status: She is alert and oriented to person, place, and time. Mental status is at baseline.   Psychiatric:         Mood and Affect: " Mood normal.           Assessment/Plan   Diagnoses and all orders for this visit:    1. Seasonal allergic rhinitis due to pollen (Primary)    2. Tonsil symptom      I reviewed her symptoms and concerns.  I did not appreciate any tonsillar stones on exam today, she has some mild inflammation and postnasal drainage noted.  I advised for her to start gargling with salty water, if her symptoms do not improve then she continues to have issues I do recommend for her to see the ENT for a possible direct laryngoscopy.  If it comes to the allergy symptoms I advised for her to start using over-the-counter Flonase.  I also advised for her to start using over-the-counter moisturizing eyedrops due to some eye symptoms.          Return if symptoms worsen or fail to improve, for Recheck.    Requested Prescriptions      No prescriptions requested or ordered in this encounter

## 2021-08-16 ENCOUNTER — OFFICE VISIT (OUTPATIENT)
Dept: FAMILY MEDICINE CLINIC | Facility: CLINIC | Age: 21
End: 2021-08-16

## 2021-08-16 VITALS
WEIGHT: 165 LBS | DIASTOLIC BLOOD PRESSURE: 82 MMHG | BODY MASS INDEX: 30.36 KG/M2 | OXYGEN SATURATION: 98 % | TEMPERATURE: 97.1 F | HEIGHT: 62 IN | SYSTOLIC BLOOD PRESSURE: 119 MMHG | HEART RATE: 90 BPM | RESPIRATION RATE: 17 BRPM

## 2021-08-16 DIAGNOSIS — L73.9 FOLLICULITIS OF RIGHT AXILLA: Primary | ICD-10-CM

## 2021-08-16 PROCEDURE — 99213 OFFICE O/P EST LOW 20 MIN: CPT | Performed by: FAMILY MEDICINE

## 2021-08-16 RX ORDER — SULFAMETHOXAZOLE AND TRIMETHOPRIM 800; 160 MG/1; MG/1
1 TABLET ORAL 2 TIMES DAILY
Qty: 14 TABLET | Refills: 0 | Status: SHIPPED | OUTPATIENT
Start: 2021-08-16 | End: 2021-08-23

## 2021-08-16 NOTE — PROGRESS NOTES
Subjective   Alpesh Ocasio is a 21 y.o. female.     21-year-old female patient presents with complaint of a bump/sore under right armpit.  Symptoms noted 1 to 2 weeks ago.  She is complaining of pain but denies any redness, discharge and fever.         The following portions of the patient's history were reviewed and updated as appropriate: past medical history, past social history, past surgical history and problem list.    Review of Systems   Constitutional: Negative for fever.   Skin: Positive for skin lesions.        Knot under right armpit       Objective   Physical Exam  Vitals reviewed.   Pulmonary:      Effort: Pulmonary effort is normal.   Skin:     Findings: No erythema.      Comments: Right armpit knot with positive tenderness but no redness and  discharge   Neurological:      Mental Status: She is alert and oriented to person, place, and time.       Vitals:    08/16/21 1602   BP: 119/82   Pulse: 90   Resp: 17   Temp: 97.1 °F (36.2 °C)   SpO2: 98%     Current Outpatient Medications on File Prior to Visit   Medication Sig Dispense Refill   • fexofenadine (ALLEGRA ALLERGY) 180 MG tablet ALLEGRA ALLERGY 180 MG TABS     • fluticasone (FLONASE) 50 MCG/ACT nasal spray INHALE 2 SPRAYS IN EACH NOSTRIL ONE TIME A DAY  16 g 0   • omeprazole (priLOSEC) 20 MG capsule Take 20 mg by mouth Daily.     • Vienva 0.1-20 MG-MCG per tablet Take 1 tablet by mouth Daily.       No current facility-administered medications on file prior to visit.           Assessment/Plan   Problems Addressed this Visit        Skin    Folliculitis of right axilla - Primary     Advise warm compress, Rx Bactrim DS.           Diagnoses       Codes Comments    Folliculitis of right axilla    -  Primary ICD-10-CM: L73.9  ICD-9-CM: 704.8

## 2021-08-17 ENCOUNTER — TELEPHONE (OUTPATIENT)
Dept: FAMILY MEDICINE CLINIC | Facility: CLINIC | Age: 21
End: 2021-08-17

## 2021-08-17 NOTE — TELEPHONE ENCOUNTER
This is my patient, however she was seen by Dr. Mazariegos and has question for her.  Please route this message to Dr. Mazariegos.

## 2021-08-17 NOTE — TELEPHONE ENCOUNTER
PATIENT STATES THAT AT YESTERDAYS VISIT SHE NEGLECTED TO TELL DR PEREZ THAT SHE WAS ON DOXYCYCLINE (DOSAGE UNKNOWN) 1CAP A DAY  FOR 3 MONTHS FOR ACNE. SHE STATES THAT DR PEREZ PRESCRIBED AN ANTIBIOTIC YESTERDAY AND SHE WOULD LIKE TO BE ADVISED ON COMBINING THE 2.      PATIENT CAN BE REACHED AT  [phone]

## 2021-08-18 NOTE — TELEPHONE ENCOUNTER
Called patient again but  no answer I would recommend while she is on Bactrim for a week,  hold on doxycycline and restart once finished the Bactrim.

## 2021-08-24 PROBLEM — L73.9 FOLLICULITIS OF RIGHT AXILLA: Status: ACTIVE | Noted: 2021-08-24

## 2021-08-24 PROBLEM — J06.9 ACUTE URI: Status: RESOLVED | Noted: 2021-03-04 | Resolved: 2021-08-24

## 2021-09-05 DIAGNOSIS — J06.9 ACUTE URI: ICD-10-CM

## 2021-09-05 RX ORDER — FLUTICASONE PROPIONATE 50 MCG
SPRAY, SUSPENSION (ML) NASAL
Qty: 16 G | Refills: 0 | Status: SHIPPED | OUTPATIENT
Start: 2021-09-05 | End: 2021-11-11 | Stop reason: SDUPTHER

## 2021-09-10 ENCOUNTER — OFFICE VISIT (OUTPATIENT)
Dept: FAMILY MEDICINE CLINIC | Facility: CLINIC | Age: 21
End: 2021-09-10

## 2021-09-10 VITALS
TEMPERATURE: 97.3 F | HEIGHT: 62 IN | RESPIRATION RATE: 18 BRPM | OXYGEN SATURATION: 98 % | HEART RATE: 85 BPM | WEIGHT: 166 LBS | SYSTOLIC BLOOD PRESSURE: 118 MMHG | DIASTOLIC BLOOD PRESSURE: 80 MMHG | BODY MASS INDEX: 30.55 KG/M2

## 2021-09-10 DIAGNOSIS — R21 RASH: Primary | ICD-10-CM

## 2021-09-10 PROCEDURE — 99213 OFFICE O/P EST LOW 20 MIN: CPT | Performed by: FAMILY MEDICINE

## 2021-09-10 RX ORDER — DOXYCYCLINE HYCLATE 100 MG/1
CAPSULE ORAL
COMMUNITY
Start: 2021-08-27 | End: 2021-11-11

## 2021-09-10 RX ORDER — TRIAMCINOLONE ACETONIDE 1 MG/G
1 CREAM TOPICAL 2 TIMES DAILY
Qty: 28.4 G | Refills: 0 | Status: SHIPPED | OUTPATIENT
Start: 2021-09-10 | End: 2023-04-06

## 2021-09-10 NOTE — PROGRESS NOTES
Subjective   Chief Complaint   Patient presents with   • Rash     L armpit     Alpesh Ocasio is a 21 y.o. female.     Patient Care Team:  Joy Meadows MD as PCP - General    She is coming in today due to rash and discomfort in the left armpit area.  She noted some discomfort and then later on redness in that area last night.  It is causing some pain when she is moving her arm or pushes on that area.  She is not aware of any trauma.  She took ibuprofen earlier and that helped.  She denies any fever, chills, or any insect bites.  She tells me that few days prior to that she did some work around her house and use her arms more, but she is not aware of any direct impact and injury.       The following portions of the patient's history were reviewed and updated as appropriate: allergies, current medications, past family history, past medical history, past social history, past surgical history and problem list.  Past Medical History:   Diagnosis Date   • Allergic rhinitis    • Arachnoid cyst    • Concussion    • Fatty liver    • GERD (gastroesophageal reflux disease)      Past Surgical History:   Procedure Laterality Date   • CYST REMOVAL      neck     The patient has a family history of  Family History   Problem Relation Age of Onset   • Diabetes Maternal Grandmother    • Diabetes Maternal Grandfather    • Lung cancer Paternal Grandfather      Social History     Socioeconomic History   • Marital status: Single     Spouse name: Not on file   • Number of children: Not on file   • Years of education: Not on file   • Highest education level: Not on file   Tobacco Use   • Smoking status: Never Smoker   • Smokeless tobacco: Never Used   Substance and Sexual Activity   • Alcohol use: Yes     Comment: occasionally   • Drug use: No   • Sexual activity: Defer       Review of Systems   Constitutional: Negative for chills and fever.   Skin: Positive for color change and rash. Negative for pallor and skin lesions.   Neurological:  "Negative for weakness and numbness.     Visit Vitals  /80   Pulse 85   Temp 97.3 °F (36.3 °C)   Resp 18   Ht 157.5 cm (62\")   Wt 75.3 kg (166 lb)   SpO2 98%   BMI 30.36 kg/m²       Current Outpatient Medications:   •  doxycycline (VIBRAMYCIN) 100 MG capsule, , Disp: , Rfl:   •  fexofenadine (ALLEGRA ALLERGY) 180 MG tablet, ALLEGRA ALLERGY 180 MG TABS, Disp: , Rfl:   •  fluticasone (FLONASE) 50 MCG/ACT nasal spray, INHALE 2 SPRAYS IN EACH NOSTRIL ONE TIME A DAY , Disp: 16 g, Rfl: 0  •  omeprazole (priLOSEC) 20 MG capsule, Take 20 mg by mouth Daily., Disp: , Rfl:   •  triamcinolone (KENALOG) 0.1 % cream, Apply 1 application topically to the appropriate area as directed 2 (Two) Times a Day., Disp: 28.4 g, Rfl: 0  •  Vienva 0.1-20 MG-MCG per tablet, Take 1 tablet by mouth Daily., Disp: , Rfl:     Objective   Physical Exam  Constitutional:       General: She is not in acute distress.     Appearance: Normal appearance. She is well-developed. She is not ill-appearing or diaphoretic.      Comments: Patient is in no distress, patient has normal voice and speech.  Normal respiratory effort.   HENT:      Head: Normocephalic and atraumatic.   Pulmonary:      Effort: Pulmonary effort is normal.   Musculoskeletal:      Cervical back: Normal range of motion and neck supple.   Skin:     Comments: There is a very mild macular rash noted in front of the left axilla, area is slightly tender to touch.  No signs of infection.  No petechia.   Neurological:      General: No focal deficit present.      Mental Status: She is alert and oriented to person, place, and time. Mental status is at baseline.   Psychiatric:         Mood and Affect: Mood normal.         Assessment/Plan   Diagnoses and all orders for this visit:    1. Rash (Primary)  -     triamcinolone (KENALOG) 0.1 % cream; Apply 1 application topically to the appropriate area as directed 2 (Two) Times a Day.  Dispense: 28.4 g; Refill: 0      Her symptoms seem to be due to " some localized inflammation.  She already took some over-the-counter ibuprofen and that seemed to help and I advised for her to continue that for the next few days.  I also gave her prescription for steroid cream and she will use that as well.  She is to monitor her symptoms and contact us back if no improvement.          Return if symptoms worsen or fail to improve, for Recheck.    Requested Prescriptions     Signed Prescriptions Disp Refills   • triamcinolone (KENALOG) 0.1 % cream 28.4 g 0     Sig: Apply 1 application topically to the appropriate area as directed 2 (Two) Times a Day.

## 2021-10-01 ENCOUNTER — LAB (OUTPATIENT)
Dept: FAMILY MEDICINE CLINIC | Facility: CLINIC | Age: 21
End: 2021-10-01

## 2021-10-01 ENCOUNTER — OFFICE VISIT (OUTPATIENT)
Dept: FAMILY MEDICINE CLINIC | Facility: CLINIC | Age: 21
End: 2021-10-01

## 2021-10-01 VITALS
SYSTOLIC BLOOD PRESSURE: 112 MMHG | WEIGHT: 163.8 LBS | BODY MASS INDEX: 30.14 KG/M2 | OXYGEN SATURATION: 98 % | HEART RATE: 92 BPM | TEMPERATURE: 97.5 F | HEIGHT: 62 IN | RESPIRATION RATE: 16 BRPM | DIASTOLIC BLOOD PRESSURE: 79 MMHG

## 2021-10-01 DIAGNOSIS — Z23 INFLUENZA VACCINE NEEDED: ICD-10-CM

## 2021-10-01 DIAGNOSIS — Z00.00 PREVENTATIVE HEALTH CARE: Primary | ICD-10-CM

## 2021-10-01 DIAGNOSIS — E55.9 VITAMIN D DEFICIENCY: ICD-10-CM

## 2021-10-01 PROBLEM — L55.0 SUNBURN OF FIRST DEGREE: Status: RESOLVED | Noted: 2020-06-10 | Resolved: 2021-10-01

## 2021-10-01 PROBLEM — R21 RASH: Status: RESOLVED | Noted: 2020-06-10 | Resolved: 2021-10-01

## 2021-10-01 PROBLEM — L73.9 FOLLICULITIS OF RIGHT AXILLA: Status: RESOLVED | Noted: 2021-08-24 | Resolved: 2021-10-01

## 2021-10-01 LAB
25(OH)D3 SERPL-MCNC: 34.1 NG/ML (ref 30–100)
ALBUMIN SERPL-MCNC: 4.7 G/DL (ref 3.5–5.2)
ALBUMIN/GLOB SERPL: 1.8 G/DL
ALP SERPL-CCNC: 63 U/L (ref 39–117)
ALT SERPL W P-5'-P-CCNC: 18 U/L (ref 1–33)
ANION GAP SERPL CALCULATED.3IONS-SCNC: 10.8 MMOL/L (ref 5–15)
AST SERPL-CCNC: 21 U/L (ref 1–32)
BASOPHILS # BLD AUTO: 0.02 10*3/MM3 (ref 0–0.2)
BASOPHILS NFR BLD AUTO: 0.4 % (ref 0–1.5)
BILIRUB SERPL-MCNC: 0.4 MG/DL (ref 0–1.2)
BUN SERPL-MCNC: 10 MG/DL (ref 6–20)
BUN/CREAT SERPL: 12.7 (ref 7–25)
CALCIUM SPEC-SCNC: 9.6 MG/DL (ref 8.6–10.5)
CHLORIDE SERPL-SCNC: 103 MMOL/L (ref 98–107)
CHOLEST SERPL-MCNC: 170 MG/DL (ref 0–200)
CO2 SERPL-SCNC: 25.2 MMOL/L (ref 22–29)
CREAT SERPL-MCNC: 0.79 MG/DL (ref 0.57–1)
DEPRECATED RDW RBC AUTO: 41.8 FL (ref 37–54)
EOSINOPHIL # BLD AUTO: 0.09 10*3/MM3 (ref 0–0.4)
EOSINOPHIL NFR BLD AUTO: 1.7 % (ref 0.3–6.2)
ERYTHROCYTE [DISTWIDTH] IN BLOOD BY AUTOMATED COUNT: 13.8 % (ref 12.3–15.4)
GFR SERPL CREATININE-BSD FRML MDRD: 90 ML/MIN/1.73
GLOBULIN UR ELPH-MCNC: 2.6 GM/DL
GLUCOSE SERPL-MCNC: 81 MG/DL (ref 65–99)
HBA1C MFR BLD: 5.2 % (ref 3.5–5.6)
HCT VFR BLD AUTO: 41.2 % (ref 34–46.6)
HCV AB SER DONR QL: NORMAL
HDLC SERPL-MCNC: 51 MG/DL (ref 40–60)
HGB BLD-MCNC: 13.3 G/DL (ref 12–15.9)
IMM GRANULOCYTES # BLD AUTO: 0.01 10*3/MM3 (ref 0–0.05)
IMM GRANULOCYTES NFR BLD AUTO: 0.2 % (ref 0–0.5)
LDLC SERPL CALC-MCNC: 102 MG/DL (ref 0–100)
LDLC/HDLC SERPL: 1.97 {RATIO}
LYMPHOCYTES # BLD AUTO: 2.18 10*3/MM3 (ref 0.7–3.1)
LYMPHOCYTES NFR BLD AUTO: 41.4 % (ref 19.6–45.3)
MCH RBC QN AUTO: 26.8 PG (ref 26.6–33)
MCHC RBC AUTO-ENTMCNC: 32.3 G/DL (ref 31.5–35.7)
MCV RBC AUTO: 83.1 FL (ref 79–97)
MONOCYTES # BLD AUTO: 0.51 10*3/MM3 (ref 0.1–0.9)
MONOCYTES NFR BLD AUTO: 9.7 % (ref 5–12)
NEUTROPHILS NFR BLD AUTO: 2.45 10*3/MM3 (ref 1.7–7)
NEUTROPHILS NFR BLD AUTO: 46.6 % (ref 42.7–76)
NRBC BLD AUTO-RTO: 0 /100 WBC (ref 0–0.2)
PLATELET # BLD AUTO: 334 10*3/MM3 (ref 140–450)
PMV BLD AUTO: 11.5 FL (ref 6–12)
POTASSIUM SERPL-SCNC: 3.9 MMOL/L (ref 3.5–5.2)
PROT SERPL-MCNC: 7.3 G/DL (ref 6–8.5)
RBC # BLD AUTO: 4.96 10*6/MM3 (ref 3.77–5.28)
SODIUM SERPL-SCNC: 139 MMOL/L (ref 136–145)
TRIGL SERPL-MCNC: 93 MG/DL (ref 0–150)
TSH SERPL DL<=0.05 MIU/L-ACNC: 1.19 UIU/ML (ref 0.27–4.2)
VLDLC SERPL-MCNC: 17 MG/DL (ref 5–40)
WBC # BLD AUTO: 5.26 10*3/MM3 (ref 3.4–10.8)

## 2021-10-01 PROCEDURE — 90471 IMMUNIZATION ADMIN: CPT | Performed by: FAMILY MEDICINE

## 2021-10-01 PROCEDURE — 90686 IIV4 VACC NO PRSV 0.5 ML IM: CPT | Performed by: FAMILY MEDICINE

## 2021-10-01 PROCEDURE — 99395 PREV VISIT EST AGE 18-39: CPT | Performed by: FAMILY MEDICINE

## 2021-10-01 PROCEDURE — 36415 COLL VENOUS BLD VENIPUNCTURE: CPT | Performed by: FAMILY MEDICINE

## 2021-10-01 PROCEDURE — 84443 ASSAY THYROID STIM HORMONE: CPT | Performed by: FAMILY MEDICINE

## 2021-10-01 PROCEDURE — 86803 HEPATITIS C AB TEST: CPT | Performed by: FAMILY MEDICINE

## 2021-10-01 PROCEDURE — 82306 VITAMIN D 25 HYDROXY: CPT | Performed by: FAMILY MEDICINE

## 2021-10-01 PROCEDURE — 80053 COMPREHEN METABOLIC PANEL: CPT | Performed by: FAMILY MEDICINE

## 2021-10-01 PROCEDURE — 83036 HEMOGLOBIN GLYCOSYLATED A1C: CPT | Performed by: FAMILY MEDICINE

## 2021-10-01 PROCEDURE — 85025 COMPLETE CBC W/AUTO DIFF WBC: CPT | Performed by: FAMILY MEDICINE

## 2021-10-01 PROCEDURE — 80061 LIPID PANEL: CPT | Performed by: FAMILY MEDICINE

## 2021-10-01 NOTE — PROGRESS NOTES
Subjective   Chief Complaint   Patient presents with   • Annual Exam     Alpesh Ocasio is a 21 y.o. female.     Patient Care Team:  Joy Meadows MD as PCP - General  LuisSilke MD as Consulting Physician (Obstetrics and Gynecology)  Silvestre Calvin MD (Dermatology)    She is coming in today with her mom for her annual wellness exam and she would like to get some fasting blood work as this has not been done in a while.  She gets her gynecological checkups through her gynecologist and reports to be up to speed.  She is followed by dermatologist due to acne issues. Patient does not report any chest pain, shortness of breath, dizziness, nausea, vomiting, or diarrhea, visual issues, headaches, numbness or tingling. No urinary issues reported like urgency, frequency, or discomfort upon urination.  No significant weight changes reported.  No swelling reported.  No rashes or any other skin issues reported. No emotional issues or insomnia.         The following portions of the patient's history were reviewed and updated as appropriate: allergies, current medications, past family history, past medical history, past social history, past surgical history and problem list.  Past Medical History:   Diagnosis Date   • Allergic rhinitis    • Arachnoid cyst    • Concussion    • Fatty liver    • GERD (gastroesophageal reflux disease)      Past Surgical History:   Procedure Laterality Date   • CYST REMOVAL      neck     The patient has a family history of  Family History   Problem Relation Age of Onset   • Diabetes Maternal Grandmother    • Diabetes Maternal Grandfather    • Lung cancer Paternal Grandfather      Social History     Socioeconomic History   • Marital status: Single     Spouse name: Not on file   • Number of children: Not on file   • Years of education: Not on file   • Highest education level: Not on file   Tobacco Use   • Smoking status: Never Smoker   • Smokeless tobacco: Never Used   Substance and  "Sexual Activity   • Alcohol use: Yes     Comment: occasionally   • Drug use: No   • Sexual activity: Defer       Review of Systems   Constitutional: Negative for activity change, appetite change, chills, fatigue, fever, unexpected weight gain and unexpected weight loss.   HENT: Negative for congestion, ear pain, hearing loss, nosebleeds, postnasal drip, swollen glands, tinnitus and trouble swallowing.    Eyes: Negative for blurred vision, double vision and visual disturbance.   Respiratory: Negative for cough, choking, chest tightness, shortness of breath, wheezing and stridor.    Cardiovascular: Negative for chest pain, palpitations and leg swelling.   Gastrointestinal: Negative for abdominal distention, abdominal pain, anal bleeding, constipation, diarrhea, nausea, vomiting and GERD.   Endocrine: Negative for cold intolerance, heat intolerance and polyphagia.   Genitourinary: Negative for dysuria, flank pain, frequency, hematuria and urgency.   Musculoskeletal: Negative for arthralgias, back pain, gait problem, joint swelling and neck pain.   Skin: Negative for color change, dry skin and skin lesions.   Allergic/Immunologic: Negative for environmental allergies and food allergies.   Neurological: Negative for dizziness, tremors, speech difficulty, light-headedness, numbness, headache and confusion.   Hematological: Negative for adenopathy. Does not bruise/bleed easily.   Psychiatric/Behavioral: Negative for decreased concentration, sleep disturbance, depressed mood and stress.     Visit Vitals  /79 (BP Location: Left arm, Patient Position: Sitting, Cuff Size: Adult)   Pulse 92   Temp 97.5 °F (36.4 °C)   Resp 16   Ht 157.5 cm (62\")   Wt 74.3 kg (163 lb 12.8 oz)   LMP 09/27/2021 (Approximate)   SpO2 98%   BMI 29.96 kg/m²       Current Outpatient Medications:   •  doxycycline (VIBRAMYCIN) 100 MG capsule, , Disp: , Rfl:   •  fexofenadine (ALLEGRA ALLERGY) 180 MG tablet, ALLEGRA ALLERGY 180 MG TABS, Disp: , Rfl: "   •  fluticasone (FLONASE) 50 MCG/ACT nasal spray, INHALE 2 SPRAYS IN EACH NOSTRIL ONE TIME A DAY , Disp: 16 g, Rfl: 0  •  omeprazole (priLOSEC) 20 MG capsule, Take 20 mg by mouth Daily., Disp: , Rfl:   •  triamcinolone (KENALOG) 0.1 % cream, Apply 1 application topically to the appropriate area as directed 2 (Two) Times a Day., Disp: 28.4 g, Rfl: 0  •  Vienva 0.1-20 MG-MCG per tablet, Take 1 tablet by mouth Daily., Disp: , Rfl:     Objective   Physical Exam  Vitals and nursing note reviewed.   Constitutional:       General: She is not in acute distress.     Appearance: She is well-developed. She is not diaphoretic.   HENT:      Head: Normocephalic and atraumatic.      Right Ear: External ear normal.      Left Ear: External ear normal.   Eyes:      General: No scleral icterus.        Right eye: No discharge.         Left eye: No discharge.      Conjunctiva/sclera: Conjunctivae normal.      Pupils: Pupils are equal, round, and reactive to light.   Neck:      Thyroid: No thyromegaly.      Vascular: No JVD.   Cardiovascular:      Rate and Rhythm: Normal rate and regular rhythm.      Heart sounds: Normal heart sounds. No murmur heard.   No friction rub. No gallop.    Pulmonary:      Effort: Pulmonary effort is normal. No respiratory distress.      Breath sounds: Normal breath sounds. No stridor. No wheezing, rhonchi or rales.   Abdominal:      General: Bowel sounds are normal. There is no distension.      Palpations: Abdomen is soft. There is no mass.      Tenderness: There is no abdominal tenderness. There is no guarding or rebound.      Hernia: No hernia is present.   Musculoskeletal:         General: No swelling, tenderness or deformity. Normal range of motion.      Cervical back: Normal range of motion and neck supple. No muscular tenderness.      Right lower leg: No edema.      Left lower leg: No edema.   Lymphadenopathy:      Cervical: No cervical adenopathy.   Skin:     General: Skin is warm and dry.       Capillary Refill: Capillary refill takes less than 2 seconds.      Coloration: Skin is not pale.      Findings: No bruising, erythema, lesion or rash.   Neurological:      General: No focal deficit present.      Mental Status: She is alert and oriented to person, place, and time.      Cranial Nerves: No cranial nerve deficit.      Sensory: No sensory deficit.      Motor: No weakness.      Coordination: Coordination normal.      Deep Tendon Reflexes: Reflexes normal.   Psychiatric:         Mood and Affect: Mood normal.         Behavior: Behavior normal.         Judgment: Judgment normal.         Assessment/Plan   Diagnoses and all orders for this visit:    1. Preventative health care (Primary)  -     Hepatitis C Antibody  -     CBC Auto Differential  -     Comprehensive Metabolic Panel  -     Hemoglobin A1c  -     Lipid Panel  -     TSH  -     Vitamin D 25 Hydroxy    2. Vitamin D deficiency  -     Vitamin D 25 Hydroxy    3. Influenza vaccine needed  -     FluLaval/Fluarix >6 Months (8917-9781)      Wellness exam was done today - see above for details. Healthy life style was reviewed and discussed and re-enforced. Regular exercise and healthy diet were also discussed and recommended.  I will be getting fasting blood work.  She gets her gynecological checkups and Pap smears through her gynecologist and reports to be up to speed.  She is fully vaccinated against COVID-19.  She was given flu shot today.                Return in about 1 year (around 10/1/2022) for Annual physical.    Requested Prescriptions      No prescriptions requested or ordered in this encounter

## 2021-10-08 ENCOUNTER — OFFICE VISIT (OUTPATIENT)
Dept: FAMILY MEDICINE CLINIC | Facility: CLINIC | Age: 21
End: 2021-10-08

## 2021-10-08 VITALS
RESPIRATION RATE: 16 BRPM | WEIGHT: 165.2 LBS | BODY MASS INDEX: 30.4 KG/M2 | HEART RATE: 84 BPM | HEIGHT: 62 IN | OXYGEN SATURATION: 96 % | SYSTOLIC BLOOD PRESSURE: 125 MMHG | TEMPERATURE: 98.2 F | DIASTOLIC BLOOD PRESSURE: 56 MMHG

## 2021-10-08 DIAGNOSIS — M79.629 PAIN IN AXILLA, UNSPECIFIED LATERALITY: Primary | ICD-10-CM

## 2021-10-08 PROCEDURE — 99213 OFFICE O/P EST LOW 20 MIN: CPT | Performed by: FAMILY MEDICINE

## 2021-10-08 NOTE — PROGRESS NOTES
Subjective   Chief Complaint   Patient presents with   • Breast Problem     left     Alpesh Ocasio is a 21 y.o. female.     Patient Care Team:  Joy Meadows MD as PCP - General  LuisSilke MD as Consulting Physician (Obstetrics and Gynecology)  Silvestre Calvin MD (Dermatology)    She is coming in today to talk about her breast and armpit problems.  She tells me that for the last couple of months she has noted some discomfort in the axillary areas bilaterally.  At times it feels like discomfort and also fullness.  She also noted some redness in these areas.  She was started previously on topical steroid which she has been using as needed.  She was trying to evaluate some triggers and she does not believe that this is due to her bras.  She started noticing however more issues when she switched antiperspirant couple of months ago.  Couple of days ago she also noted redness in lump feeling in the left breast close to the areola, it feels better now.  She is on birth control and she has been on the same birth control for quite some time.       The following portions of the patient's history were reviewed and updated as appropriate: allergies, current medications, past family history, past medical history, past social history, past surgical history and problem list.  Past Medical History:   Diagnosis Date   • Allergic rhinitis    • Arachnoid cyst    • Concussion    • Fatty liver    • GERD (gastroesophageal reflux disease)      Past Surgical History:   Procedure Laterality Date   • CYST REMOVAL      neck     The patient has a family history of  Family History   Problem Relation Age of Onset   • Diabetes Maternal Grandmother    • Diabetes Maternal Grandfather    • Lung cancer Paternal Grandfather      Social History     Socioeconomic History   • Marital status: Single   Tobacco Use   • Smoking status: Never Smoker   • Smokeless tobacco: Never Used   Substance and Sexual Activity   • Alcohol use: Yes      "Comment: occasionally   • Drug use: No   • Sexual activity: Defer       Review of Systems   Constitutional: Negative for chills and fever.   Genitourinary: Positive for breast lump. Negative for breast discharge.   Skin: Positive for color change. Negative for dry skin.     Visit Vitals  /56 (BP Location: Left arm, Patient Position: Sitting, Cuff Size: Adult)   Pulse 84   Temp 98.2 °F (36.8 °C)   Resp 16   Ht 157.5 cm (62.01\")   Wt 74.9 kg (165 lb 3.2 oz)   LMP 09/27/2021 (Approximate)   SpO2 96%   BMI 30.21 kg/m²       Current Outpatient Medications:   •  doxycycline (VIBRAMYCIN) 100 MG capsule, , Disp: , Rfl:   •  fexofenadine (ALLEGRA ALLERGY) 180 MG tablet, ALLEGRA ALLERGY 180 MG TABS, Disp: , Rfl:   •  fluticasone (FLONASE) 50 MCG/ACT nasal spray, INHALE 2 SPRAYS IN EACH NOSTRIL ONE TIME A DAY , Disp: 16 g, Rfl: 0  •  omeprazole (priLOSEC) 20 MG capsule, Take 20 mg by mouth Daily., Disp: , Rfl:   •  triamcinolone (KENALOG) 0.1 % cream, Apply 1 application topically to the appropriate area as directed 2 (Two) Times a Day., Disp: 28.4 g, Rfl: 0  •  Vienva 0.1-20 MG-MCG per tablet, Take 1 tablet by mouth Daily., Disp: , Rfl:     Objective   Physical Exam  Constitutional:       General: She is not in acute distress.     Appearance: Normal appearance. She is well-developed. She is not ill-appearing or diaphoretic.      Comments: Patient is in no distress, patient has normal voice and speech.  Normal respiratory effort.   HENT:      Head: Normocephalic and atraumatic.   Pulmonary:      Effort: Pulmonary effort is normal.   Chest:      Comments: There is a mild area of localized skin redness in the left axilla, bilateral breast exam is intact, there is some tenderness noted just above the left areola, but I do not appreciate any obvious mass.  Musculoskeletal:      Cervical back: Normal range of motion and neck supple.   Neurological:      General: No focal deficit present.      Mental Status: She is alert and " oriented to person, place, and time. Mental status is at baseline.   Psychiatric:         Mood and Affect: Mood normal.         Assessment/Plan   Diagnoses and all orders for this visit:    1. Pain in axilla, unspecified laterality (Primary)  -     US Axilla Bilateral; Future      I advised for her to stop using her current antiperspirant as it looks like her axillary symptoms started after that.  She may continue topical steroid cream.  I will be getting axillary ultrasound to rule out some deeper tissue problems.  If it comes to a left breast symptoms this is already improving and I advised for her to closely monitor.  We will possibly might need to get an ultrasound of the area.          Return if symptoms worsen or fail to improve, for Recheck.    Requested Prescriptions      No prescriptions requested or ordered in this encounter

## 2021-10-09 PROBLEM — M79.629 PAIN IN AXILLA: Status: ACTIVE | Noted: 2021-10-09

## 2021-10-21 ENCOUNTER — HOSPITAL ENCOUNTER (OUTPATIENT)
Dept: ULTRASOUND IMAGING | Facility: HOSPITAL | Age: 21
Discharge: HOME | End: 2021-10-21
Admitting: FAMILY MEDICINE

## 2021-10-21 DIAGNOSIS — M79.629 PAIN IN AXILLA, UNSPECIFIED LATERALITY: ICD-10-CM

## 2021-10-21 PROCEDURE — 76882 US LMTD JT/FCL EVL NVASC XTR: CPT

## 2021-11-05 ENCOUNTER — APPOINTMENT (OUTPATIENT)
Dept: ULTRASOUND IMAGING | Facility: HOSPITAL | Age: 21
End: 2021-11-05

## 2021-11-11 ENCOUNTER — OFFICE VISIT (OUTPATIENT)
Dept: FAMILY MEDICINE CLINIC | Facility: CLINIC | Age: 21
End: 2021-11-11

## 2021-11-11 VITALS
HEIGHT: 62 IN | SYSTOLIC BLOOD PRESSURE: 122 MMHG | WEIGHT: 164.6 LBS | OXYGEN SATURATION: 97 % | DIASTOLIC BLOOD PRESSURE: 86 MMHG | HEART RATE: 84 BPM | TEMPERATURE: 97.5 F | RESPIRATION RATE: 16 BRPM | BODY MASS INDEX: 30.29 KG/M2

## 2021-11-11 DIAGNOSIS — Z79.899 HIGH RISK MEDICATION USE: Primary | ICD-10-CM

## 2021-11-11 DIAGNOSIS — J06.9 ACUTE URI: ICD-10-CM

## 2021-11-11 DIAGNOSIS — F41.9 ANXIETY: ICD-10-CM

## 2021-11-11 DIAGNOSIS — F32.9 REACTIVE DEPRESSION: ICD-10-CM

## 2021-11-11 PROCEDURE — 99214 OFFICE O/P EST MOD 30 MIN: CPT | Performed by: FAMILY MEDICINE

## 2021-11-11 RX ORDER — FLUTICASONE PROPIONATE 50 MCG
2 SPRAY, SUSPENSION (ML) NASAL DAILY
Qty: 16 G | Refills: 0 | Status: SHIPPED | OUTPATIENT
Start: 2021-11-11 | End: 2022-01-02 | Stop reason: SDUPTHER

## 2021-11-11 RX ORDER — CITALOPRAM 10 MG/1
10 TABLET ORAL DAILY
Qty: 30 TABLET | Refills: 0 | Status: SHIPPED | OUTPATIENT
Start: 2021-11-11 | End: 2021-11-18

## 2021-11-11 NOTE — PROGRESS NOTES
Subjective   Chief Complaint   Patient presents with   • Depression   • Anxiety     Her symptoms are getting worse     Alpesh Ocasio is a 21 y.o. female.     Patient Care Team:  Joy Meadows MD as PCP - General  Silke Smith MD as Consulting Physician (Obstetrics and Gynecology)  Silvestre Calvin MD (Dermatology)    She is coming in today to talk about her emotional problems.  She has been dealing with anxiety and depressive symptoms on and off for about 4-5 years.  She has been able to cope with it on her own, however due to some increased stress in life over the last 3 months her symptoms have been definitely getting worse.  She feels down and depressed, she sometimes feels irritable and sometimes unmotivated.  She has been doing a lot of crying, she also has been having more issues with anxiety and even panic attacks.  She has sometimes good days when her symptoms seem to be pretty well controlled and then really bad days.  She already schedule an appointment to see a counselor and this is on 12/2/2021.  She has never been on medication, but considering how much worse her symptoms are now she would like to discuss it and be started on medicine.       The following portions of the patient's history were reviewed and updated as appropriate: allergies, current medications, past family history, past medical history, past social history, past surgical history and problem list.  Past Medical History:   Diagnosis Date   • Allergic rhinitis    • Arachnoid cyst    • Concussion    • Fatty liver    • GERD (gastroesophageal reflux disease)      Past Surgical History:   Procedure Laterality Date   • CYST REMOVAL      neck     The patient has a family history of  Family History   Problem Relation Age of Onset   • Diabetes Maternal Grandmother    • Diabetes Maternal Grandfather    • Lung cancer Paternal Grandfather      Social History     Socioeconomic History   • Marital status: Single   Tobacco Use   •  "Smoking status: Never Smoker   • Smokeless tobacco: Never Used   Substance and Sexual Activity   • Alcohol use: Yes     Comment: occasionally   • Drug use: No   • Sexual activity: Defer       Review of Systems   Constitutional: Negative for activity change, appetite change and fatigue.   Gastrointestinal: Negative for diarrhea, nausea and vomiting.   Skin: Negative for dry skin, rash and skin lesions.   Psychiatric/Behavioral: Positive for depressed mood and stress. Negative for agitation, behavioral problems, decreased concentration, dysphoric mood, hallucinations, self-injury, sleep disturbance, suicidal ideas and negative for hyperactivity. The patient is nervous/anxious.      Visit Vitals  /86 (BP Location: Left arm, Patient Position: Sitting, Cuff Size: Adult)   Pulse 84   Temp 97.5 °F (36.4 °C)   Resp 16   Ht 157.5 cm (62\")   Wt 74.7 kg (164 lb 9.6 oz)   SpO2 97%   BMI 30.11 kg/m²       Current Outpatient Medications:   •  citalopram (CeleXA) 10 MG tablet, Take 1 tablet by mouth Daily., Disp: 30 tablet, Rfl: 0  •  fexofenadine (ALLEGRA ALLERGY) 180 MG tablet, ALLEGRA ALLERGY 180 MG TABS, Disp: , Rfl:   •  fluticasone (FLONASE) 50 MCG/ACT nasal spray, INHALE 2 SPRAYS IN EACH NOSTRIL ONE TIME A DAY , Disp: 16 g, Rfl: 0  •  omeprazole (priLOSEC) 20 MG capsule, Take 20 mg by mouth Daily., Disp: , Rfl:   •  triamcinolone (KENALOG) 0.1 % cream, Apply 1 application topically to the appropriate area as directed 2 (Two) Times a Day., Disp: 28.4 g, Rfl: 0  •  Vienva 0.1-20 MG-MCG per tablet, Take 1 tablet by mouth Daily., Disp: , Rfl:     Objective   Physical Exam  Constitutional:       General: She is not in acute distress.     Appearance: Normal appearance. She is well-developed. She is not ill-appearing or diaphoretic.      Comments: Patient is in no distress, patient has normal voice and speech.  Normal respiratory effort.   HENT:      Head: Normocephalic and atraumatic.   Pulmonary:      Effort: Pulmonary " effort is normal.   Musculoskeletal:      Cervical back: Normal range of motion and neck supple.   Neurological:      General: No focal deficit present.      Mental Status: She is alert and oriented to person, place, and time. Mental status is at baseline.   Psychiatric:         Mood and Affect: Mood normal.         Assessment/Plan   Diagnoses and all orders for this visit:    1. High risk medication use (Primary)    2. Reactive depression  -     citalopram (CeleXA) 10 MG tablet; Take 1 tablet by mouth Daily.  Dispense: 30 tablet; Refill: 0    3. Anxiety      Her symptoms of anxiety and depression are poorly controlled and getting worse.  She is already scheduled to see a counselor for the first time on 12/2/2021 and I do believe she will benefit from it.  We discussed and reviewed the medications and I will be starting her on a small dose of citalopram.  She will be reevaluated in 1 month.        Return in about 1 month (around 12/11/2021) for Next scheduled follow up.    Requested Prescriptions     Signed Prescriptions Disp Refills   • citalopram (CeleXA) 10 MG tablet 30 tablet 0     Sig: Take 1 tablet by mouth Daily.

## 2021-11-17 DIAGNOSIS — F32.9 REACTIVE DEPRESSION: ICD-10-CM

## 2021-11-17 RX ORDER — CITALOPRAM 10 MG/1
TABLET ORAL
Qty: 30 TABLET | Refills: 0 | OUTPATIENT
Start: 2021-11-17

## 2021-11-18 DIAGNOSIS — F32.9 REACTIVE DEPRESSION: ICD-10-CM

## 2021-11-18 RX ORDER — CITALOPRAM 10 MG/1
TABLET ORAL
Qty: 30 TABLET | Refills: 0 | Status: SHIPPED | OUTPATIENT
Start: 2021-11-18 | End: 2021-12-09

## 2021-12-09 ENCOUNTER — OFFICE VISIT (OUTPATIENT)
Dept: FAMILY MEDICINE CLINIC | Facility: CLINIC | Age: 21
End: 2021-12-09

## 2021-12-09 VITALS
TEMPERATURE: 97.5 F | BODY MASS INDEX: 30.33 KG/M2 | HEIGHT: 62 IN | OXYGEN SATURATION: 98 % | DIASTOLIC BLOOD PRESSURE: 80 MMHG | HEART RATE: 87 BPM | SYSTOLIC BLOOD PRESSURE: 117 MMHG | RESPIRATION RATE: 16 BRPM | WEIGHT: 164.8 LBS

## 2021-12-09 DIAGNOSIS — J30.1 SEASONAL ALLERGIC RHINITIS DUE TO POLLEN: Primary | ICD-10-CM

## 2021-12-09 DIAGNOSIS — F41.9 ANXIETY: ICD-10-CM

## 2021-12-09 PROBLEM — M79.629 PAIN IN AXILLA: Status: RESOLVED | Noted: 2021-10-09 | Resolved: 2021-12-09

## 2021-12-09 PROBLEM — M25.562 ACUTE PAIN OF LEFT KNEE: Status: RESOLVED | Noted: 2019-09-05 | Resolved: 2021-12-09

## 2021-12-09 PROBLEM — R09.1 PLEURISY: Status: RESOLVED | Noted: 2021-02-05 | Resolved: 2021-12-09

## 2021-12-09 PROCEDURE — 99214 OFFICE O/P EST MOD 30 MIN: CPT | Performed by: FAMILY MEDICINE

## 2021-12-09 RX ORDER — AZELASTINE HCL 205.5 UG/1
2 SPRAY NASAL 2 TIMES DAILY
Qty: 30 ML | Refills: 0 | Status: SHIPPED | OUTPATIENT
Start: 2021-12-09 | End: 2023-04-06

## 2021-12-09 RX ORDER — CITALOPRAM 20 MG/1
20 TABLET ORAL DAILY
Qty: 90 TABLET | Refills: 0 | Status: SHIPPED | OUTPATIENT
Start: 2021-12-09 | End: 2022-03-19 | Stop reason: SDUPTHER

## 2021-12-09 RX ORDER — FEXOFENADINE HCL 180 MG/1
180 TABLET ORAL DAILY
Qty: 30 TABLET | Refills: 0 | Status: SHIPPED | OUTPATIENT
Start: 2021-12-09

## 2021-12-09 NOTE — PROGRESS NOTES
Subjective   Chief Complaint   Patient presents with   • Follow-up   • Anxiety     New medication follow-up   • Allergic Rhinitis   • Nasal Congestion     Alpesh Ocasio is a 21 y.o. female.     Patient Care Team:  Joy Meadows MD as PCP - General  Silke Smith MD as Consulting Physician (Obstetrics and Gynecology)  Silvestre Calvin MD (Dermatology)    She is coming in today to follow-up on her newly diagnosed anxiety.  She was started on citalopram 10 mg a month ago.  She feels that medication is certainly helping with her symptoms and she does not feel as tensed, but she still has bad days when her anxiety is not well controlled.  She denies any medication side effects.  She also wants to talk about her allergies and upper respiratory symptoms.  She tells me that for the last couple of months she has been having issues with congestion and drainage.  She is already being treated for allergies and she uses Stahist over-the-counter and Flonase nasal spray.  She is fully vaccinated against COVID-19 and she denies any exposures.  She denies any chest pains, shortness of breath, headaches, or difficulty breathing.  No fever reported.       The following portions of the patient's history were reviewed and updated as appropriate: allergies, current medications, past family history, past medical history, past social history, past surgical history and problem list.  Past Medical History:   Diagnosis Date   • Allergic rhinitis    • Arachnoid cyst    • Concussion    • Fatty liver    • GERD (gastroesophageal reflux disease)      Past Surgical History:   Procedure Laterality Date   • CYST REMOVAL      neck     The patient has a family history of  Family History   Problem Relation Age of Onset   • Diabetes Maternal Grandmother    • Diabetes Maternal Grandfather    • Lung cancer Paternal Grandfather      Social History     Socioeconomic History   • Marital status: Single   Tobacco Use   • Smoking status: Never  "Smoker   • Smokeless tobacco: Never Used   Substance and Sexual Activity   • Alcohol use: Yes     Comment: occasionally   • Drug use: No   • Sexual activity: Defer       Review of Systems   Constitutional: Negative for activity change, appetite change and fatigue.   HENT: Positive for congestion and postnasal drip.    Respiratory: Negative for cough, shortness of breath and wheezing.    Gastrointestinal: Negative for diarrhea, nausea and vomiting.   Skin: Negative for dry skin, rash and skin lesions.   Allergic/Immunologic: Positive for environmental allergies.   Psychiatric/Behavioral: Negative for agitation, behavioral problems, decreased concentration, dysphoric mood, hallucinations, self-injury, sleep disturbance, suicidal ideas, negative for hyperactivity, depressed mood and stress. The patient is nervous/anxious.      Visit Vitals  /80 (BP Location: Left arm, Patient Position: Sitting, Cuff Size: Adult)   Pulse 87   Temp 97.5 °F (36.4 °C)   Resp 16   Ht 157.5 cm (62.01\")   Wt 74.8 kg (164 lb 12.8 oz)   SpO2 98%   BMI 30.13 kg/m²       Current Outpatient Medications:   •  azelastine (ASTEPRO) 0.15 % solution nasal spray, 2 sprays into the nostril(s) as directed by provider 2 (Two) Times a Day., Disp: 30 mL, Rfl: 0  •  citalopram (CeleXA) 20 MG tablet, Take 1 tablet by mouth Daily., Disp: 90 tablet, Rfl: 0  •  fexofenadine (Allegra Allergy) 180 MG tablet, Take 1 tablet by mouth Daily., Disp: 30 tablet, Rfl: 0  •  fluticasone (FLONASE) 50 MCG/ACT nasal spray, 2 sprays by Each Nare route Daily., Disp: 16 g, Rfl: 0  •  omeprazole (priLOSEC) 20 MG capsule, Take 20 mg by mouth Daily., Disp: , Rfl:   •  triamcinolone (KENALOG) 0.1 % cream, Apply 1 application topically to the appropriate area as directed 2 (Two) Times a Day., Disp: 28.4 g, Rfl: 0  •  Vienva 0.1-20 MG-MCG per tablet, Take 1 tablet by mouth Daily., Disp: , Rfl:     Objective   Physical Exam  Vitals and nursing note reviewed.   Constitutional:      "  General: She is not in acute distress.     Appearance: She is well-developed. She is not diaphoretic.   HENT:      Head: Normocephalic and atraumatic.      Right Ear: External ear normal.      Left Ear: External ear normal.      Mouth/Throat:      Pharynx: No oropharyngeal exudate.   Eyes:      Conjunctiva/sclera: Conjunctivae normal.      Pupils: Pupils are equal, round, and reactive to light.   Cardiovascular:      Rate and Rhythm: Normal rate and regular rhythm.      Heart sounds: Normal heart sounds.   Pulmonary:      Effort: Pulmonary effort is normal. No respiratory distress.      Breath sounds: Normal breath sounds. No wheezing or rales.   Musculoskeletal:      Cervical back: Normal range of motion and neck supple.   Skin:     General: Skin is warm and dry.      Findings: No rash.   Psychiatric:         Behavior: Behavior normal.         Thought Content: Thought content normal.         Judgment: Judgment normal.       Assessment/Plan   Diagnoses and all orders for this visit:    1. Seasonal allergic rhinitis due to pollen (Primary)  -     azelastine (ASTEPRO) 0.15 % solution nasal spray; 2 sprays into the nostril(s) as directed by provider 2 (Two) Times a Day.  Dispense: 30 mL; Refill: 0  -     fexofenadine (Allegra Allergy) 180 MG tablet; Take 1 tablet by mouth Daily.  Dispense: 30 tablet; Refill: 0    2. Anxiety  -     citalopram (CeleXA) 20 MG tablet; Take 1 tablet by mouth Daily.  Dispense: 90 tablet; Refill: 0      Her anxiety is still not well controlled.  However she had some response to citalopram 10 mg.  I will be increasing the dose to 20 mg.  Her allergy symptoms are not well controlled.  I will be starting her on Allegra.  I advised for her to continue Flonase and I will be adding Astepro to the regimen.  She is to monitor her symptoms and contact us back if no improvement.  She is fully vaccinated against COVID-19, she was advised to schedule her booster shot.      Return in about 3 months  (around 3/9/2022) for Next scheduled follow up.    Requested Prescriptions     Signed Prescriptions Disp Refills   • citalopram (CeleXA) 20 MG tablet 90 tablet 0     Sig: Take 1 tablet by mouth Daily.   • azelastine (ASTEPRO) 0.15 % solution nasal spray 30 mL 0     Si sprays into the nostril(s) as directed by provider 2 (Two) Times a Day.   • fexofenadine (Allegra Allergy) 180 MG tablet 30 tablet 0     Sig: Take 1 tablet by mouth Daily.

## 2021-12-14 ENCOUNTER — TELEMEDICINE (OUTPATIENT)
Dept: FAMILY MEDICINE CLINIC | Facility: CLINIC | Age: 21
End: 2021-12-14

## 2021-12-14 DIAGNOSIS — J06.9 UPPER RESPIRATORY TRACT INFECTION DUE TO COVID-19 VIRUS: Primary | ICD-10-CM

## 2021-12-14 DIAGNOSIS — U07.1 UPPER RESPIRATORY TRACT INFECTION DUE TO COVID-19 VIRUS: Primary | ICD-10-CM

## 2021-12-14 PROCEDURE — 99213 OFFICE O/P EST LOW 20 MIN: CPT | Performed by: FAMILY MEDICINE

## 2021-12-14 NOTE — PROGRESS NOTES
Subjective   Chief Complaint   Patient presents with   • Nasal Congestion   • URI   • Loss of taste and smell yesterday     Alpesh Ocasio is a 21 y.o. female.     Patient Care Team:  Joy Meadows MD as PCP - General  Silke Smith MD as Consulting Physician (Obstetrics and Gynecology)  Silvestre Calvin MD (Dermatology)     You have chosen to receive care through a telehealth visit.  Do you consent to use a video/audio connection for your medical care today? Yes    Patient is being evaluated today through telehealth medicine appointment via the video in view of COVID-19 pandemic.  Connection is being achieved through [employer].  We can see and hear each other well.  She is at home and I am in the office during this appointment.  She reports that over the last week she had some mild congestion, she does not really feel bad, however last night she started noticing some issues with taste and smell and today in the morning when she was brushing her teeth she was not able to taste her toothpaste at all.  She went ahead and got a rapid COVID-19 test done at the testing center and she was notified that it is positive.  She denies any fever, chest pains, difficulty breathing, headaches, nausea, vomiting, or diarrhea.  She is vaccinated against COVID-19 earlier this year, but she has not received her booster shot.       The following portions of the patient's history were reviewed and updated as appropriate: allergies, current medications, past family history, past medical history, past social history, past surgical history and problem list.  Past Medical History:   Diagnosis Date   • Allergic rhinitis    • Arachnoid cyst    • Concussion    • Fatty liver    • GERD (gastroesophageal reflux disease)      Past Surgical History:   Procedure Laterality Date   • CYST REMOVAL      neck     The patient has a family history of  Family History   Problem Relation Age of Onset   • Diabetes Maternal Grandmother    •  Diabetes Maternal Grandfather    • Lung cancer Paternal Grandfather      Social History     Socioeconomic History   • Marital status: Single   Tobacco Use   • Smoking status: Never Smoker   • Smokeless tobacco: Never Used   Substance and Sexual Activity   • Alcohol use: Yes     Comment: occasionally   • Drug use: No   • Sexual activity: Defer       Review of Systems   Constitutional: Positive for fatigue. Negative for activity change, appetite change, chills and fever.   HENT: Positive for congestion. Negative for ear pain, postnasal drip, sinus pressure, sore throat and swollen glands.    Respiratory: Negative for cough, choking, chest tightness, shortness of breath, wheezing and stridor.    Cardiovascular: Negative for chest pain.   Skin: Negative for dry skin and rash.     There were no vitals taken for this visit.    Current Outpatient Medications:   •  azelastine (ASTEPRO) 0.15 % solution nasal spray, 2 sprays into the nostril(s) as directed by provider 2 (Two) Times a Day., Disp: 30 mL, Rfl: 0  •  citalopram (CeleXA) 20 MG tablet, Take 1 tablet by mouth Daily., Disp: 90 tablet, Rfl: 0  •  fexofenadine (Allegra Allergy) 180 MG tablet, Take 1 tablet by mouth Daily., Disp: 30 tablet, Rfl: 0  •  fluticasone (FLONASE) 50 MCG/ACT nasal spray, 2 sprays by Each Nare route Daily., Disp: 16 g, Rfl: 0  •  omeprazole (priLOSEC) 20 MG capsule, Take 20 mg by mouth Daily., Disp: , Rfl:   •  triamcinolone (KENALOG) 0.1 % cream, Apply 1 application topically to the appropriate area as directed 2 (Two) Times a Day., Disp: 28.4 g, Rfl: 0  •  Vienva 0.1-20 MG-MCG per tablet, Take 1 tablet by mouth Daily., Disp: , Rfl:     Objective   Physical Exam  Constitutional:       General: She is not in acute distress.     Appearance: Normal appearance. She is well-developed. She is not ill-appearing or diaphoretic.      Comments: Patient is in no distress, patient has normal voice and speech.  Normal respiratory effort.   HENT:      Head:  Normocephalic and atraumatic.   Pulmonary:      Effort: Pulmonary effort is normal.   Musculoskeletal:      Cervical back: Normal range of motion and neck supple.   Neurological:      General: No focal deficit present.      Mental Status: She is alert and oriented to person, place, and time. Mental status is at baseline.   Psychiatric:         Mood and Affect: Mood normal.       Assessment/Plan   Diagnoses and all orders for this visit:    1. Upper respiratory tract infection due to COVID-19 virus (Primary)      Symptomatic treatment with Tylenol or Ibuprofen as needed for recommended, as well as rest and hydration. He was advised to stay in solation for 10 days providing she is getting better. She will monitor her symptoms and she will need to be re-evaluated if not getting better, getting worse or new symptoms develop.        Return if symptoms worsen or fail to improve, for Recheck.    Requested Prescriptions      No prescriptions requested or ordered in this encounter

## 2022-01-02 DIAGNOSIS — J06.9 ACUTE URI: ICD-10-CM

## 2022-01-02 RX ORDER — FLUTICASONE PROPIONATE 50 MCG
SPRAY, SUSPENSION (ML) NASAL
Qty: 16 G | Refills: 0 | Status: SHIPPED | OUTPATIENT
Start: 2022-01-02 | End: 2022-01-12

## 2022-01-12 DIAGNOSIS — J06.9 ACUTE URI: ICD-10-CM

## 2022-01-12 RX ORDER — FLUTICASONE PROPIONATE 50 MCG
SPRAY, SUSPENSION (ML) NASAL
Qty: 16 G | Refills: 0 | Status: SHIPPED | OUTPATIENT
Start: 2022-01-12 | End: 2022-03-09

## 2022-03-09 DIAGNOSIS — J06.9 ACUTE URI: ICD-10-CM

## 2022-03-09 RX ORDER — FLUTICASONE PROPIONATE 50 MCG
SPRAY, SUSPENSION (ML) NASAL
Qty: 16 G | Refills: 0 | Status: SHIPPED | OUTPATIENT
Start: 2022-03-09 | End: 2022-10-05 | Stop reason: SDUPTHER

## 2022-03-19 DIAGNOSIS — F41.9 ANXIETY: ICD-10-CM

## 2022-03-19 RX ORDER — CITALOPRAM 20 MG/1
20 TABLET ORAL DAILY
Qty: 90 TABLET | Refills: 0 | Status: SHIPPED | OUTPATIENT
Start: 2022-03-19 | End: 2022-06-27 | Stop reason: SDUPTHER

## 2022-05-12 ENCOUNTER — OFFICE VISIT (OUTPATIENT)
Dept: FAMILY MEDICINE CLINIC | Facility: CLINIC | Age: 22
End: 2022-05-12

## 2022-05-12 VITALS
TEMPERATURE: 98.4 F | BODY MASS INDEX: 32.31 KG/M2 | SYSTOLIC BLOOD PRESSURE: 122 MMHG | RESPIRATION RATE: 16 BRPM | HEIGHT: 62 IN | OXYGEN SATURATION: 98 % | WEIGHT: 175.6 LBS | HEART RATE: 111 BPM | DIASTOLIC BLOOD PRESSURE: 82 MMHG

## 2022-05-12 DIAGNOSIS — J06.9 ACUTE URI: Primary | ICD-10-CM

## 2022-05-12 PROBLEM — Z23 INFLUENZA VACCINE NEEDED: Status: RESOLVED | Noted: 2021-10-01 | Resolved: 2022-05-12

## 2022-05-12 PROCEDURE — 99213 OFFICE O/P EST LOW 20 MIN: CPT | Performed by: FAMILY MEDICINE

## 2022-05-12 RX ORDER — CITALOPRAM 20 MG/1
1 TABLET ORAL DAILY
COMMUNITY
Start: 2021-12-30 | End: 2022-05-12 | Stop reason: SDUPTHER

## 2022-05-12 RX ORDER — AZITHROMYCIN 250 MG/1
250 TABLET, FILM COATED ORAL DAILY
Qty: 6 TABLET | Refills: 0 | Status: SHIPPED | OUTPATIENT
Start: 2022-05-12 | End: 2022-05-17

## 2022-05-12 RX ORDER — FEXOFENADINE HCL 180 MG/1
1 TABLET ORAL DAILY
COMMUNITY
Start: 2021-12-09 | End: 2023-04-06 | Stop reason: SDUPTHER

## 2022-05-12 RX ORDER — FLUTICASONE PROPIONATE 50 MCG
2 SPRAY, SUSPENSION (ML) NASAL DAILY
COMMUNITY
Start: 2022-01-02 | End: 2022-05-12 | Stop reason: SDUPTHER

## 2022-05-12 NOTE — PROGRESS NOTES
Subjective   Chief Complaint   Patient presents with   • green mucus   • Sore Throat   • URI   • Sinus Problem     Alpesh Ocasio is a 21 y.o. female.     Patient Care Team:  Joy Meadows MD as PCP - Silke Turner MD as Consulting Physician (Obstetrics and Gynecology)  Silvestre Calvin MD (Dermatology)    She is coming in today due to upper respiratory symptoms, which she has been dealing with for the last week or so and they are getting worse.  She has been having some congestion and pressure in her sinuses.  She has been getting some greenish nasal discharge.  She takes over-the-counter allergy medications as needed.  She is getting  tomorrow and then in 2 days she is leaving country for her honeymoon.  She wants to get checked to make sure that she is okay.  No fever, chest pains, or difficulty breathing are being reported.  She tested positive for COVID-19 in 12/2021.  She is not aware of being exposed to COVID recently.  She even took home COVID-19 test about a week ago and it was negative.       The following portions of the patient's history were reviewed and updated as appropriate: allergies, current medications, past family history, past medical history, past social history, past surgical history and problem list.  Past Medical History:   Diagnosis Date   • Allergic rhinitis    • Arachnoid cyst    • Concussion    • Fatty liver    • GERD (gastroesophageal reflux disease)      Past Surgical History:   Procedure Laterality Date   • CYST REMOVAL      neck     The patient has a family history of  Family History   Problem Relation Age of Onset   • Diabetes Maternal Grandmother    • Diabetes Maternal Grandfather    • Lung cancer Paternal Grandfather      Social History     Socioeconomic History   • Marital status: Single   Tobacco Use   • Smoking status: Never Smoker   • Smokeless tobacco: Never Used   Vaping Use   • Vaping Use: Never used   Substance and Sexual Activity   • Alcohol  "use: Yes     Comment: occasionally   • Drug use: No   • Sexual activity: Defer       Review of Systems   Constitutional: Negative for activity change, appetite change, chills and fever.   HENT: Positive for congestion, sinus pressure and sore throat. Negative for ear pain, postnasal drip and swollen glands.    Respiratory: Negative for cough, choking, chest tightness, shortness of breath, wheezing and stridor.    Cardiovascular: Negative for chest pain.   Skin: Negative for dry skin and rash.     Visit Vitals  /82 (BP Location: Left arm, Patient Position: Sitting, Cuff Size: Adult)   Pulse 111   Temp 98.4 °F (36.9 °C) (Temporal)   Resp 16   Ht 157.5 cm (62.01\")   Wt 79.7 kg (175 lb 9.6 oz)   SpO2 98%   BMI 32.11 kg/m²       Current Outpatient Medications:   •  citalopram (CeleXA) 20 MG tablet, Take 1 tablet by mouth Daily., Disp: 90 tablet, Rfl: 0  •  fluticasone (FLONASE) 50 MCG/ACT nasal spray, instill 2 sprays in each nostril one time daily, Disp: 16 g, Rfl: 0  •  omeprazole (priLOSEC) 20 MG capsule, Take 20 mg by mouth Daily., Disp: , Rfl:   •  Vienva 0.1-20 MG-MCG per tablet, Take 1 tablet by mouth Daily., Disp: , Rfl:   •  azelastine (ASTEPRO) 0.15 % solution nasal spray, 2 sprays into the nostril(s) as directed by provider 2 (Two) Times a Day., Disp: 30 mL, Rfl: 0  •  azithromycin (ZITHROMAX) 250 MG tablet, Take 1 tablet by mouth Daily for 5 days. Take 2 tablets the first day, then 1 tablet daily for 4 days., Disp: 6 tablet, Rfl: 0  •  fexofenadine (Allegra Allergy) 180 MG tablet, Take 1 tablet by mouth Daily., Disp: 30 tablet, Rfl: 0  •  fexofenadine (ALLEGRA) 180 MG tablet, Take 1 tablet by mouth Daily., Disp: , Rfl:   •  triamcinolone (KENALOG) 0.1 % cream, Apply 1 application topically to the appropriate area as directed 2 (Two) Times a Day., Disp: 28.4 g, Rfl: 0    Objective   Physical Exam  Vitals and nursing note reviewed.   Constitutional:       General: She is not in acute distress.     " Appearance: She is well-developed.   HENT:      Head: Normocephalic and atraumatic.      Right Ear: External ear normal.      Left Ear: External ear normal.      Mouth/Throat:      Pharynx: No oropharyngeal exudate.   Eyes:      Conjunctiva/sclera: Conjunctivae normal.      Pupils: Pupils are equal, round, and reactive to light.   Cardiovascular:      Rate and Rhythm: Normal rate and regular rhythm.      Heart sounds: Normal heart sounds.   Pulmonary:      Effort: Pulmonary effort is normal. No respiratory distress.      Breath sounds: Normal breath sounds. No wheezing or rales.   Musculoskeletal:      Cervical back: Normal range of motion and neck supple.   Skin:     General: Skin is warm and dry.      Findings: No rash.         Assessment & Plan   Diagnoses and all orders for this visit:    1. Acute URI (Primary)  -     azithromycin (ZITHROMAX) 250 MG tablet; Take 1 tablet by mouth Daily for 5 days. Take 2 tablets the first day, then 1 tablet daily for 4 days.  Dispense: 6 tablet; Refill: 0      I suspect that her symptoms are due to upper respiratory viral infection.  I advised for her to continue symptomatic treatment with over-the-counter medications including Stahist as needed and Flonase nasal spray for her allergies.  Considering that she is leaving the country I gave her prescription for azithromycin, but advised for her not to start taking it now and only start the medication if her symptoms get worse over time.  Patient did not want to get COVID-19 test done today.      Return if symptoms worsen or fail to improve, for Recheck.    Requested Prescriptions     Signed Prescriptions Disp Refills   • azithromycin (ZITHROMAX) 250 MG tablet 6 tablet 0     Sig: Take 1 tablet by mouth Daily for 5 days. Take 2 tablets the first day, then 1 tablet daily for 4 days.

## 2022-06-27 DIAGNOSIS — F41.9 ANXIETY: ICD-10-CM

## 2022-06-27 RX ORDER — CITALOPRAM 20 MG/1
20 TABLET ORAL DAILY
Qty: 90 TABLET | Refills: 0 | Status: SHIPPED | OUTPATIENT
Start: 2022-06-27 | End: 2022-10-04 | Stop reason: SDUPTHER

## 2022-09-21 ENCOUNTER — APPOINTMENT (OUTPATIENT)
Dept: GENERAL RADIOLOGY | Facility: HOSPITAL | Age: 22
End: 2022-09-21

## 2022-09-21 ENCOUNTER — HOSPITAL ENCOUNTER (OUTPATIENT)
Facility: HOSPITAL | Age: 22
Discharge: HOME | End: 2022-09-21
Attending: EMERGENCY MEDICINE

## 2022-09-21 VITALS
HEART RATE: 90 BPM | RESPIRATION RATE: 17 BRPM | SYSTOLIC BLOOD PRESSURE: 141 MMHG | WEIGHT: 175 LBS | OXYGEN SATURATION: 99 % | HEIGHT: 62 IN | BODY MASS INDEX: 32.2 KG/M2 | TEMPERATURE: 98.2 F | DIASTOLIC BLOOD PRESSURE: 109 MMHG

## 2022-09-21 DIAGNOSIS — S93.601A FOOT SPRAIN, RIGHT, INITIAL ENCOUNTER: Primary | ICD-10-CM

## 2022-09-21 PROCEDURE — 99282 EMERGENCY DEPT VISIT SF MDM: CPT | Performed by: EMERGENCY MEDICINE

## 2022-09-21 PROCEDURE — G0463 HOSPITAL OUTPT CLINIC VISIT: HCPCS | Performed by: EMERGENCY MEDICINE

## 2022-09-21 PROCEDURE — EDLOS: Performed by: EMERGENCY MEDICINE

## 2022-09-21 PROCEDURE — 73620 X-RAY EXAM OF FOOT: CPT

## 2022-09-21 RX ORDER — NAPROXEN 500 MG/1
500 TABLET ORAL 2 TIMES DAILY WITH MEALS
Qty: 14 TABLET | Refills: 0 | Status: SHIPPED | OUTPATIENT
Start: 2022-09-21 | End: 2023-04-06

## 2022-09-21 RX ORDER — IBUPROFEN 400 MG/1
400 TABLET ORAL ONCE
Status: COMPLETED | OUTPATIENT
Start: 2022-09-21 | End: 2022-09-21

## 2022-09-21 RX ADMIN — IBUPROFEN 400 MG: 400 TABLET, FILM COATED ORAL at 22:01

## 2022-09-22 DIAGNOSIS — M79.671 RIGHT FOOT PAIN: Primary | ICD-10-CM

## 2022-09-24 ENCOUNTER — APPOINTMENT (OUTPATIENT)
Dept: CT IMAGING | Facility: HOSPITAL | Age: 22
End: 2022-09-24

## 2022-09-24 ENCOUNTER — HOSPITAL ENCOUNTER (EMERGENCY)
Facility: HOSPITAL | Age: 22
Discharge: LEFT BEFORE DISCHARGE | End: 2022-09-24
Attending: EMERGENCY MEDICINE

## 2022-09-24 ENCOUNTER — HOSPITAL ENCOUNTER (EMERGENCY)
Facility: HOSPITAL | Age: 22
Discharge: HOME | End: 2022-09-24
Attending: EMERGENCY MEDICINE | Admitting: EMERGENCY MEDICINE

## 2022-09-24 VITALS
HEART RATE: 89 BPM | TEMPERATURE: 98.4 F | RESPIRATION RATE: 18 BRPM | WEIGHT: 175 LBS | SYSTOLIC BLOOD PRESSURE: 142 MMHG | OXYGEN SATURATION: 98 % | BODY MASS INDEX: 32.2 KG/M2 | DIASTOLIC BLOOD PRESSURE: 91 MMHG | HEIGHT: 62 IN

## 2022-09-24 VITALS
HEART RATE: 90 BPM | RESPIRATION RATE: 16 BRPM | SYSTOLIC BLOOD PRESSURE: 129 MMHG | TEMPERATURE: 98.4 F | HEIGHT: 62 IN | DIASTOLIC BLOOD PRESSURE: 89 MMHG | BODY MASS INDEX: 34.61 KG/M2 | OXYGEN SATURATION: 99 % | WEIGHT: 188.05 LBS

## 2022-09-24 DIAGNOSIS — N30.90 CYSTITIS: ICD-10-CM

## 2022-09-24 DIAGNOSIS — K52.9 COLITIS: Primary | ICD-10-CM

## 2022-09-24 LAB
ALBUMIN SERPL-MCNC: 4.47 G/DL (ref 3.5–5.2)
ALBUMIN/GLOB SERPL: 1.8 G/DL
ALP SERPL-CCNC: 74 U/L (ref 39–117)
ALT SERPL W P-5'-P-CCNC: 16 U/L (ref 1–33)
ANION GAP SERPL CALCULATED.3IONS-SCNC: 9.1 MMOL/L (ref 5–15)
AST SERPL-CCNC: 17 U/L (ref 1–32)
B-HCG UR QL: NEGATIVE
B-HCG UR QL: NEGATIVE
BACTERIA UR QL AUTO: ABNORMAL /HPF
BACTERIA UR QL AUTO: ABNORMAL /HPF
BASOPHILS # BLD AUTO: 0.02 10*3/MM3 (ref 0–0.2)
BASOPHILS NFR BLD AUTO: 0.1 % (ref 0–1.5)
BILIRUB SERPL-MCNC: 0.4 MG/DL (ref 0–1.2)
BILIRUB UR QL STRIP: NEGATIVE
BILIRUB UR QL STRIP: NEGATIVE
BUN SERPL-MCNC: 10 MG/DL (ref 6–20)
BUN/CREAT SERPL: 13.2 (ref 7–25)
CALCIUM SPEC-SCNC: 10.5 MG/DL (ref 8.6–10.5)
CHLORIDE SERPL-SCNC: 102 MMOL/L (ref 98–107)
CLARITY UR: ABNORMAL
CLARITY UR: CLEAR
CO2 SERPL-SCNC: 27.9 MMOL/L (ref 22–29)
COLOR UR: YELLOW
COLOR UR: YELLOW
CREAT SERPL-MCNC: 0.76 MG/DL (ref 0.57–1)
DEPRECATED RDW RBC AUTO: 40.5 FL (ref 37–54)
EGFRCR SERPLBLD CKD-EPI 2021: 112.4 ML/MIN/1.73
EOSINOPHIL # BLD AUTO: 0.06 10*3/MM3 (ref 0–0.4)
EOSINOPHIL NFR BLD AUTO: 0.4 % (ref 0.3–6.2)
ERYTHROCYTE [DISTWIDTH] IN BLOOD BY AUTOMATED COUNT: 13.5 % (ref 12.3–15.4)
GLOBULIN UR ELPH-MCNC: 2.4 GM/DL
GLUCOSE SERPL-MCNC: 118 MG/DL (ref 65–99)
GLUCOSE UR STRIP-MCNC: NEGATIVE MG/DL
GLUCOSE UR STRIP-MCNC: NEGATIVE MG/DL
HCT VFR BLD AUTO: 41.7 % (ref 34–46.6)
HGB BLD-MCNC: 13.7 G/DL (ref 12–15.9)
HGB UR QL STRIP.AUTO: ABNORMAL
HGB UR QL STRIP.AUTO: ABNORMAL
HOLD SPECIMEN: NORMAL
HOLD SPECIMEN: NORMAL
HYALINE CASTS UR QL AUTO: ABNORMAL /LPF
HYALINE CASTS UR QL AUTO: ABNORMAL /LPF
IMM GRANULOCYTES # BLD AUTO: 0.03 10*3/MM3 (ref 0–0.05)
IMM GRANULOCYTES NFR BLD AUTO: 0.2 % (ref 0–0.5)
KETONES UR QL STRIP: NEGATIVE
KETONES UR QL STRIP: NEGATIVE
LEUKOCYTE ESTERASE UR QL STRIP.AUTO: ABNORMAL
LEUKOCYTE ESTERASE UR QL STRIP.AUTO: ABNORMAL
LIPASE SERPL-CCNC: 22 U/L (ref 13–60)
LYMPHOCYTES # BLD AUTO: 1.75 10*3/MM3 (ref 0.7–3.1)
LYMPHOCYTES NFR BLD AUTO: 12.3 % (ref 19.6–45.3)
MCH RBC QN AUTO: 26.6 PG (ref 26.6–33)
MCHC RBC AUTO-ENTMCNC: 32.9 G/DL (ref 31.5–35.7)
MCV RBC AUTO: 80.8 FL (ref 79–97)
MONOCYTES # BLD AUTO: 1.05 10*3/MM3 (ref 0.1–0.9)
MONOCYTES NFR BLD AUTO: 7.4 % (ref 5–12)
NEUTROPHILS NFR BLD AUTO: 11.34 10*3/MM3 (ref 1.7–7)
NEUTROPHILS NFR BLD AUTO: 79.6 % (ref 42.7–76)
NITRITE UR QL STRIP: NEGATIVE
NITRITE UR QL STRIP: NEGATIVE
PH UR STRIP.AUTO: 7.5 [PH] (ref 5–8)
PH UR STRIP.AUTO: 8 [PH] (ref 5–8)
PLATELET # BLD AUTO: 355 10*3/MM3 (ref 140–450)
PMV BLD AUTO: 10.2 FL (ref 6–12)
POTASSIUM SERPL-SCNC: 3.9 MMOL/L (ref 3.5–5.2)
PROT SERPL-MCNC: 6.9 G/DL (ref 6–8.5)
PROT UR QL STRIP: NEGATIVE
PROT UR QL STRIP: NEGATIVE
RBC # BLD AUTO: 5.16 10*6/MM3 (ref 3.77–5.28)
RBC # UR STRIP: ABNORMAL /HPF
RBC # UR STRIP: ABNORMAL /HPF
REF LAB TEST METHOD: ABNORMAL
REF LAB TEST METHOD: ABNORMAL
SODIUM SERPL-SCNC: 139 MMOL/L (ref 136–145)
SP GR UR STRIP: 1.01 (ref 1–1.03)
SP GR UR STRIP: 1.02 (ref 1–1.03)
SQUAMOUS #/AREA URNS HPF: ABNORMAL /HPF
SQUAMOUS #/AREA URNS HPF: ABNORMAL /HPF
UROBILINOGEN UR QL STRIP: ABNORMAL
UROBILINOGEN UR QL STRIP: ABNORMAL
WBC # UR STRIP: ABNORMAL /HPF
WBC # UR STRIP: ABNORMAL /HPF
WBC NRBC COR # BLD: 14.25 10*3/MM3 (ref 3.4–10.8)
WHOLE BLOOD HOLD SPECIMEN: NORMAL

## 2022-09-24 PROCEDURE — 36415 COLL VENOUS BLD VENIPUNCTURE: CPT

## 2022-09-24 PROCEDURE — 85025 COMPLETE CBC W/AUTO DIFF WBC: CPT | Performed by: EMERGENCY MEDICINE

## 2022-09-24 PROCEDURE — 99283 EMERGENCY DEPT VISIT LOW MDM: CPT

## 2022-09-24 PROCEDURE — 99283 EMERGENCY DEPT VISIT LOW MDM: CPT | Performed by: EMERGENCY MEDICINE

## 2022-09-24 PROCEDURE — 81001 URINALYSIS AUTO W/SCOPE: CPT | Performed by: EMERGENCY MEDICINE

## 2022-09-24 PROCEDURE — 81025 URINE PREGNANCY TEST: CPT

## 2022-09-24 PROCEDURE — 96375 TX/PRO/DX INJ NEW DRUG ADDON: CPT

## 2022-09-24 PROCEDURE — 83690 ASSAY OF LIPASE: CPT | Performed by: EMERGENCY MEDICINE

## 2022-09-24 PROCEDURE — 96374 THER/PROPH/DIAG INJ IV PUSH: CPT

## 2022-09-24 PROCEDURE — 25010000002 KETOROLAC TROMETHAMINE PER 15 MG: Performed by: EMERGENCY MEDICINE

## 2022-09-24 PROCEDURE — 74176 CT ABD & PELVIS W/O CONTRAST: CPT | Performed by: EMERGENCY MEDICINE

## 2022-09-24 PROCEDURE — 99211 OFF/OP EST MAY X REQ PHY/QHP: CPT | Performed by: EMERGENCY MEDICINE

## 2022-09-24 PROCEDURE — 81001 URINALYSIS AUTO W/SCOPE: CPT

## 2022-09-24 PROCEDURE — 96361 HYDRATE IV INFUSION ADD-ON: CPT

## 2022-09-24 PROCEDURE — 80053 COMPREHEN METABOLIC PANEL: CPT | Performed by: EMERGENCY MEDICINE

## 2022-09-24 PROCEDURE — 81025 URINE PREGNANCY TEST: CPT | Performed by: EMERGENCY MEDICINE

## 2022-09-24 PROCEDURE — 25010000002 ONDANSETRON PER 1 MG: Performed by: EMERGENCY MEDICINE

## 2022-09-24 RX ORDER — ONDANSETRON 4 MG/1
4 TABLET, ORALLY DISINTEGRATING ORAL EVERY 8 HOURS PRN
Qty: 20 TABLET | Refills: 0 | Status: SHIPPED | OUTPATIENT
Start: 2022-09-24 | End: 2022-10-17

## 2022-09-24 RX ORDER — DOXYCYCLINE 100 MG/1
100 CAPSULE ORAL 2 TIMES DAILY
Qty: 28 CAPSULE | Refills: 0 | Status: SHIPPED | OUTPATIENT
Start: 2022-09-24 | End: 2023-04-06

## 2022-09-24 RX ORDER — HYDROCODONE BITARTRATE AND ACETAMINOPHEN 5; 325 MG/1; MG/1
1 TABLET ORAL ONCE AS NEEDED
Status: DISCONTINUED | OUTPATIENT
Start: 2022-09-24 | End: 2022-09-24 | Stop reason: HOSPADM

## 2022-09-24 RX ORDER — HYDROCODONE BITARTRATE AND ACETAMINOPHEN 5; 325 MG/1; MG/1
1 TABLET ORAL EVERY 6 HOURS PRN
Qty: 12 TABLET | Refills: 0 | Status: SHIPPED | OUTPATIENT
Start: 2022-09-24 | End: 2022-10-17

## 2022-09-24 RX ORDER — SODIUM CHLORIDE 0.9 % (FLUSH) 0.9 %
10 SYRINGE (ML) INJECTION AS NEEDED
Status: DISCONTINUED | OUTPATIENT
Start: 2022-09-24 | End: 2022-09-24 | Stop reason: HOSPADM

## 2022-09-24 RX ORDER — DICYCLOMINE HCL 20 MG
20 TABLET ORAL 4 TIMES DAILY PRN
Qty: 30 TABLET | Refills: 0 | Status: SHIPPED | OUTPATIENT
Start: 2022-09-24 | End: 2023-04-06

## 2022-09-24 RX ORDER — KETOROLAC TROMETHAMINE 30 MG/ML
15 INJECTION, SOLUTION INTRAMUSCULAR; INTRAVENOUS ONCE
Status: COMPLETED | OUTPATIENT
Start: 2022-09-24 | End: 2022-09-24

## 2022-09-24 RX ORDER — ONDANSETRON 2 MG/ML
4 INJECTION INTRAMUSCULAR; INTRAVENOUS ONCE
Status: COMPLETED | OUTPATIENT
Start: 2022-09-24 | End: 2022-09-24

## 2022-09-24 RX ADMIN — KETOROLAC TROMETHAMINE 15 MG: 30 INJECTION, SOLUTION INTRAMUSCULAR at 05:02

## 2022-09-24 RX ADMIN — ONDANSETRON 4 MG: 2 INJECTION INTRAMUSCULAR; INTRAVENOUS at 05:01

## 2022-09-24 RX ADMIN — Medication 10 ML: at 05:01

## 2022-09-24 RX ADMIN — SODIUM CHLORIDE 1000 ML: 9 INJECTION, SOLUTION INTRAVENOUS at 05:02

## 2022-09-24 NOTE — ED PROVIDER NOTES
Subjective   History of Present Illness  Chief complaint: Abdominal pain started at 6:30 PM last night.      HPI: 22-year-old female presents with a approximately 11-hour history of lower abdominal pain that radiates to the right lower back.  It varies in intensity from 4/10 which is now up to 7/10 at its highest intensity.  She had no nausea or vomiting with it.  However she has had increasing pain with having a bowel movement.  She has noticed some blood in her stool.  She denies dysuria, fever, chills.    She actually presented to another emergency department tonight however when she got there and checked and her pain had improved and she decided to go home.  However when she arrived back at her domicile the pain returned with a vengeance and she decided to seek care here.        Review of Systems   Gastrointestinal: Positive for abdominal pain and blood in stool. Negative for constipation, diarrhea, nausea, rectal pain and vomiting.   Genitourinary: Positive for flank pain and pelvic pain. Negative for decreased urine volume, dysuria and vaginal bleeding.   Neurological: Positive for dizziness.   All other systems reviewed and are negative.      Past Medical History:   Diagnosis Date   • Allergic rhinitis    • Arachnoid cyst    • Concussion    • Fatty liver    • GERD (gastroesophageal reflux disease)        No Known Allergies    Past Surgical History:   Procedure Laterality Date   • CYST REMOVAL      neck       Family History   Problem Relation Age of Onset   • Diabetes Maternal Grandmother    • Diabetes Maternal Grandfather    • Lung cancer Paternal Grandfather        Social History     Socioeconomic History   • Marital status:    Tobacco Use   • Smoking status: Never Smoker   • Smokeless tobacco: Never Used   Vaping Use   • Vaping Use: Never used   Substance and Sexual Activity   • Alcohol use: Yes     Comment: occasionally   • Drug use: No   • Sexual activity: Defer           Objective   Physical  Exam  Vitals and nursing note reviewed.   Constitutional:       General: She is not in acute distress.     Appearance: She is well-developed.   HENT:      Head: Normocephalic.      Mouth/Throat:      Mouth: Mucous membranes are moist.   Eyes:      Extraocular Movements: Extraocular movements intact.   Cardiovascular:      Rate and Rhythm: Normal rate and regular rhythm.      Heart sounds: Normal heart sounds. No murmur heard.    No friction rub.   Pulmonary:      Effort: Pulmonary effort is normal.      Breath sounds: Normal breath sounds.   Abdominal:      General: Abdomen is flat. Bowel sounds are normal. There is no distension or abdominal bruit.      Palpations: Abdomen is soft.      Tenderness: There is abdominal tenderness in the suprapubic area. There is no right CVA tenderness, guarding or rebound.   Skin:     General: Skin is warm.   Neurological:      General: No focal deficit present.      Mental Status: She is alert. She is disoriented.   Psychiatric:         Behavior: Behavior normal.         Procedures           ED Course      Orders Placed This Encounter   Procedures   • CT Abdomen Pelvis Without Contrast     Standing Status:   Standing     Number of Occurrences:   1     Order Specific Question:   Will Oral Contrast be needed for this procedure?     Answer:   No     Order Specific Question:   Patient Pregnant     Answer:   No   • [place] Draw     Standing Status:   Standing     Number of Occurrences:   1     Order Specific Question:   Blue     Answer:   Yes [1]     Order Specific Question:   Green- WITH Gel     Answer:   Yes [1]     Order Specific Question:   Lavender     Answer:   Yes [1]     Order Specific Question:   Red     Answer:   No [0]     Order Specific Question:   Gold (or Red w Gold Ring, or Tiger)     Answer:   Yes [1]     Order Specific Question:   Green- NO Gel     Answer:   No [0]     Order Specific Question:   Culture Set     Answer:   No [0]     Order Specific Question:   Gray Top      Answer:   No [0]     Order Specific Question:   Green- WITH Gel     Answer:   Yes [1]     Order Specific Question:   Release to patient     Answer:   Routine Release   • Comprehensive Metabolic Panel     Standing Status:   Standing     Number of Occurrences:   1   • Lipase     Standing Status:   Standing     Number of Occurrences:   1     Order Specific Question:   Release to patient     Answer:   Routine Release   • Urinalysis With Microscopic If Indicated (No Culture) - Urine, Clean Catch     Standing Status:   Standing     Number of Occurrences:   1     Order Specific Question:   Release to patient     Answer:   Routine Release   • Pregnancy, Urine - Urine, Clean Catch     Standing Status:   Standing     Number of Occurrences:   1     Order Specific Question:   Release to patient     Answer:   Routine Release   • CBC Auto Differential     Standing Status:   Standing     Number of Occurrences:   1     Order Specific Question:   Release to patient     Answer:   Routine Release   • Urinalysis, Microscopic Only - Urine, Clean Catch     Standing Status:   Standing     Number of Occurrences:   1     Order Specific Question:   Release to patient     Answer:   Routine Release   • NPO Diet NPO Type: Sips with Meds     Standing Status:   Standing     Number of Occurrences:   1     Order Specific Question:   NPO Type     Answer:   Sips with Meds   • Undress & Gown     Standing Status:   Standing     Number of Occurrences:   1   • Insert Peripheral IV     Standing Status:   Standing     Number of Occurrences:   1   • CBC & Differential     Standing Status:   Standing     Number of Occurrences:   1     Order Specific Question:   Manual Differential     Answer:   No     Order Specific Question:   Release to patient     Answer:   Routine Release   • Green Top (Gel)     Standing Status:   Standing     Number of Occurrences:   1     Order Specific Question:   Release to patient     Answer:   Routine Release   • Lavender Top      Standing Status:   Standing     Number of Occurrences:   1     Order Specific Question:   Release to patient     Answer:   Routine Release   • Gold Top - SST     Standing Status:   Standing     Number of Occurrences:   1     Order Specific Question:   Release to patient     Answer:   Routine Release   • Light Blue Top     Standing Status:   Standing     Number of Occurrences:   1     Order Specific Question:   Release to patient     Answer:   Routine Release   • ED Acknowledgement Form Needed;     Obtain ED acknowledgement form once pt stabilized and has been seen by Provider     Standing Status:   Standing     Number of Occurrences:   1          Labs Reviewed   COMPREHENSIVE METABOLIC PANEL - Abnormal; Notable for the following components:       Result Value    Glucose 118 (*)     All other components within normal limits    Narrative:     GFR Normal >60  Chronic Kidney Disease <60  Kidney Failure <15     URINALYSIS W/ MICROSCOPIC IF INDICATED (NO CULTURE) - Abnormal; Notable for the following components:    Appearance, UA Slightly Cloudy (*)     Blood, UA Large (3+) (*)     Leuk Esterase, UA Small (1+) (*)     All other components within normal limits   CBC WITH AUTO DIFFERENTIAL - Abnormal; Notable for the following components:    WBC 14.25 (*)     Neutrophil % 79.6 (*)     Lymphocyte % 12.3 (*)     Neutrophils, Absolute 11.34 (*)     Monocytes, Absolute 1.05 (*)     All other components within normal limits   LIPASE - Normal   PREGNANCY, URINE - Normal   RAINBOW DRAW    Narrative:     The following orders were created for panel order [place] Draw.  Procedure                               Abnormality         Status                     ---------                               -----------         ------                     Green Top (Gel)[[id number]]                                  Final result               Lavender Top[[id number]]                                     Final result               Gold Top -  SST[[id number]]                                   Final result               Light Blue Top[[id number]]                                                              Green Top (Gel)[[id number]]                                                               Please view results for these tests on the individual orders.   URINALYSIS, MICROSCOPIC ONLY   CBC AND DIFFERENTIAL    Narrative:     The following orders were created for panel order CBC & Differential.  Procedure                               Abnormality         Status                     ---------                               -----------         ------                     CBC Auto Differential[[id number]]        Abnormal            Final result                 Please view results for these tests on the individual orders.   GREEN TOP   LAVENDER TOP   GOLD TOP - SST   LIGHT BLUE TOP          CT Abdomen Pelvis Without Contrast   Final Result   Impression:    1. Distal colonic wall thickening suggestive of infectious or inflammatory colitis.   2. Urinary bladder wall thickening. Correlate for UTI cystitis.      Electronically signed by:  Nitesh Harrington M.D.     9/24/2022 3:19 AM                         Medications   sodium chloride 0.9 % flush 10 mL (10 mL Intravenous Given 9/24/22 0501)   sodium chloride 0.9 % bolus 1,000 mL (1,000 mL Intravenous New Bag 9/24/22 0502)   HYDROcodone-acetaminophen (NORCO) 5-325 MG per tablet 1 tablet (has no administration in time range)   ketorolac (TORADOL) injection 15 mg (15 mg Intravenous Given 9/24/22 0502)   ondansetron (ZOFRAN) injection 4 mg (4 mg Intravenous Given 9/24/22 0501)               MDM    Final diagnoses:   Colitis   Cystitis       ED Disposition  ED Disposition     ED Disposition   Discharge    Condition   Stable    Comment   --             Joy Meadows MD  [address] [id number] [phone]    Schedule an appointment as soon as possible for a visit   If not improving in 3 days or, If  symptoms worsen         Medication List      New Prescriptions    dicyclomine 20 MG tablet  Commonly known as: BENTYL  Take 1 tablet by mouth 4 (Four) Times a Day As Needed (abdominal pain).     doxycycline 100 MG capsule  Commonly known as: MONODOX  Take 1 capsule by mouth 2 (Two) Times a Day.     HYDROcodone-acetaminophen 5-325 MG per tablet  Commonly known as: NORCO  Take 1 tablet by mouth Every 6 (Six) Hours As Needed for Moderate Pain.     ondansetron ODT 4 MG disintegrating tablet  Commonly known as: ZOFRAN-ODT  Place 1 tablet on the tongue Every 8 (Eight) Hours As Needed for Nausea or Vomiting.           Where to Get Your Medications      These medications were sent to [facility]/pharmacy #[id number] - [place], [place] - [address] [place] [address] - [phone]  - [phone] [address] [place] [address]    Hours: 24-hours Phone: [phone]   · dicyclomine 20 MG tablet  · doxycycline 100 MG capsule  · HYDROcodone-acetaminophen 5-325 MG per tablet  · ondansetron ODT 4 MG disintegrating tablet          Ricky Sharma MD  09/24/22 0515

## 2022-09-28 ENCOUNTER — OFFICE VISIT (OUTPATIENT)
Dept: PODIATRY | Facility: CLINIC | Age: 22
End: 2022-09-28

## 2022-09-28 VITALS — HEIGHT: 62 IN | WEIGHT: 175 LBS | HEART RATE: 80 BPM | OXYGEN SATURATION: 100 % | BODY MASS INDEX: 32.2 KG/M2

## 2022-09-28 DIAGNOSIS — M76.61 ACHILLES TENDINITIS OF RIGHT LOWER EXTREMITY: Primary | ICD-10-CM

## 2022-09-28 DIAGNOSIS — M92.61 HAGLUND'S DEFORMITY OF RIGHT HEEL: ICD-10-CM

## 2022-09-28 DIAGNOSIS — M24.573 EQUINUS CONTRACTURE OF ANKLE: ICD-10-CM

## 2022-09-28 PROCEDURE — 99202 OFFICE O/P NEW SF 15 MIN: CPT

## 2022-09-28 NOTE — PROGRESS NOTES
09/28/2022  Foot and Ankle Surgery - New Patient   Provider: Dr. Jeffery Clement DPM  Location: [facility]    Subjective:  Alpesh Nicholas is a 22 y.o. female.     Chief Complaint   Patient presents with   • Right Foot - Pain   • Initial Evaluation     Maximilian DELGADO MD 05/12/2022       HPI:   The patient is  a 22-year-old female who presents to the clinic secondary to right foot pain.    She reports she injured her foot; however, is unaware how she injured it. she notes her pain began the night of 09/19/2022 and on 09/21/2022 she reported to urgent care, at which time an x-ray was performed. [facility] states she was informed it was not a bone-related injury and was just a sprain; however, she notes it does not feel the same as a sprain. 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 She states she has pain when standing or with activity; however, denies any recent injury. She reports she does not wear supportive shoes all of the time and has been ambulation on the treadmill more often. The patient states her pain ranged from 0 to 6 out of 10, with her pain in the right posterior heel. She reports shooting pain radiating down her right leg occasionally, after standing all day. She denies having an x-ray today. or having these issues previously. She acknowledges wearing more supportive shoes and adds she was previously on a steroid taper the forefoot started hurting, which was prescribed by her dentist secondary to her jaw popping. She denies being a diabetic. She notes she was given a prescription for Naproxen , but then was informed to just take Aleve. She denies being able to take Aleve secondary to discovering on another visit to the emergency room on 09/24/2022 she has diverticulitis. The patient states she can stand on a hard floor and feel instant pain.    No Known Allergies    Past Medical History:   Diagnosis Date   • Allergic rhinitis    • Arachnoid cyst    • Concussion    • Fatty liver     • GERD (gastroesophageal reflux disease)        Past Surgical History:   Procedure Laterality Date   • CYST REMOVAL      neck       Family History   Problem Relation Age of Onset   • Diabetes Maternal Grandmother    • Diabetes Maternal Grandfather    • Lung cancer Paternal Grandfather        Social History     Socioeconomic History   • Marital status:    Tobacco Use   • Smoking status: Never Smoker   • Smokeless tobacco: Never Used   Vaping Use   • Vaping Use: Never used   Substance and Sexual Activity   • Alcohol use: Yes     Comment: occasionally   • Drug use: No   • Sexual activity: Defer        Current Outpatient Medications on File Prior to Visit   Medication Sig Dispense Refill   • azelastine (ASTEPRO) 0.15 % solution nasal spray 2 sprays into the nostril(s) as directed by provider 2 (Two) Times a Day. 30 mL 0   • citalopram (CeleXA) 20 MG tablet Take 1 tablet by mouth Daily. 90 tablet 0   • dicyclomine (BENTYL) 20 MG tablet Take 1 tablet by mouth 4 (Four) Times a Day As Needed (abdominal pain). 30 tablet 0   • doxycycline (MONODOX) 100 MG capsule Take 1 capsule by mouth 2 (Two) Times a Day. 28 capsule 0   • fexofenadine (Allegra Allergy) 180 MG tablet Take 1 tablet by mouth Daily. 30 tablet 0   • fexofenadine (ALLEGRA) 180 MG tablet Take 1 tablet by mouth Daily.     • fluticasone (FLONASE) 50 MCG/ACT nasal spray instill 2 sprays in each nostril one time daily 16 g 0   • HYDROcodone-acetaminophen (NORCO) 5-325 MG per tablet Take 1 tablet by mouth Every 6 (Six) Hours As Needed for Moderate Pain. 12 tablet 0   • naproxen (EC NAPROSYN) 500 MG EC tablet Take 1 tablet by mouth 2 (Two) Times a Day With Meals. 14 tablet 0   • omeprazole (priLOSEC) 20 MG capsule Take 20 mg by mouth Daily.     • ondansetron ODT (ZOFRAN-ODT) 4 MG disintegrating tablet Place 1 tablet on the tongue Every 8 (Eight) Hours As Needed for Nausea or Vomiting. 20 tablet 0   • triamcinolone (KENALOG) 0.1 % cream Apply 1 application  "topically to the appropriate area as directed 2 (Two) Times a Day. 28.4 g 0   • Vienva 0.1-20 MG-MCG per tablet Take 1 tablet by mouth Daily.       No current facility-administered medications on file prior to visit.       Review of Systems:  General: Denies fever, chills, fatigue, and weakness.  Eyes: Denies vision loss, blurry vision, and excessive redness.  ENT: Denies hearing issues and difficulty swallowing.  Cardiovascular: Denies palpitations, chest pain, or syncopal episodes.  Respiratory: Denies shortness of breath, wheezing, and coughing.  GI: Denies abdominal pain, nausea, and vomiting.   : Denies frequency, hematuria, and urgency.  Musculoskeletal: Denies muscle cramps, joint pains, and stiffness.  Derm: Denies rash, open wounds, or suspicious lesions.  Neuro: Denies headaches, numbness, loss of coordination, and tremors.  Psych: Denies anxiety and depression.  Endocrine: Denies temperature intolerance and changes in appetite.  Heme: Denies bleeding disorders or abnormal bruising.     Objective   Pulse 80   Ht 157.5 cm (62\")   Wt 79.4 kg (175 lb)   LMP 09/23/2022   SpO2 100%   BMI 32.01 kg/m²     Foot/Ankle Exam:       General:   Appearance: appears stated age and healthy    Orientation: AAOx3      VASCULAR      Right Foot Vascularity   Normal vascular exam    Dorsalis pedis:  2+  Posterior tibial:  2+  Skin Temperature: warm    Edema Grading:  None  CFT:  < 3 seconds  Pedal Hair Growth:  Present  Varicosities: none        NEUROLOGIC     Right Foot Neurologic   Normal sensation    Light touch sensation:  Normal  Vibratory sensation:  Normal  Hot/Cold sensation: normal    Normal reflexes    Achilles reflex:  2+  Babinski reflex:  2+     MUSCULOSKELETAL      Right Foot Musculoskeletal   Ecchymosis:  None  Tenderness: none       MUSCLE STRENGTH     Right Foot Muscle Strength   Normal strength    Foot dorsiflexion:  5  Foot plantar flexion:  5  Foot inversion:  5  Foot eversion:  5     " DERMATOLOGIC     Right Foot Dermatologic   Skin: skin intact    Nails: normal        Right Foot Additional Comments Suzette's deformity present on imaging.    XR Foot 2 View Right (09/21/2022 21:08)    Assessment & Plan   Diagnoses and all orders for this visit:    1. Achilles tendinitis of right lower extremity (Primary)    2. Suzette's deformity of right heel    3. Equinus contracture of ankle      -X-ray images from outside facility were reviewed of right foot with no acute fracture or dislocation noted.  Do feel patient has signs and symptoms consistent with right Achilles tendinitis.  Patient has mild calf tightness.  The patient is to proceed in a cam walking boot and decrease overall activity.  We did review rice therapy.  We reviewed gentle range of motion exercises for her right ankle. I recommend she return to the clinic for reevaluation in 2 weeks.    No orders of the defined types were placed in this encounter.       Note is dictated utilizing voice recognition software. Unfortunately this leads to occasional typographical errors. I apologize in advance if the situation occurs. If questions occur please do not hesitate to call our office.    Transcribed from ambient dictation for HIEU Bates by Christine Peters.  09/28/22   09:58 EDT    Patient verbalized consent to the visit recording.  I have personally performed the services described in this document as transcribed by the above individual, and it is both accurate and complete.

## 2022-10-04 DIAGNOSIS — F41.9 ANXIETY: ICD-10-CM

## 2022-10-04 RX ORDER — CITALOPRAM 20 MG/1
20 TABLET ORAL DAILY
Qty: 90 TABLET | Refills: 0 | Status: SHIPPED | OUTPATIENT
Start: 2022-10-04 | End: 2022-10-19

## 2022-10-05 DIAGNOSIS — J06.9 ACUTE URI: ICD-10-CM

## 2022-10-05 RX ORDER — FLUTICASONE PROPIONATE 50 MCG
2 SPRAY, SUSPENSION (ML) NASAL DAILY
Qty: 16 G | Refills: 0 | Status: SHIPPED | OUTPATIENT
Start: 2022-10-05 | End: 2022-12-29 | Stop reason: SDUPTHER

## 2022-10-17 ENCOUNTER — OFFICE VISIT (OUTPATIENT)
Dept: PODIATRY | Facility: CLINIC | Age: 22
End: 2022-10-17

## 2022-10-17 VITALS — HEIGHT: 62 IN | BODY MASS INDEX: 32.2 KG/M2 | WEIGHT: 175 LBS | RESPIRATION RATE: 20 BRPM

## 2022-10-17 DIAGNOSIS — M92.61 HAGLUND'S DEFORMITY OF RIGHT HEEL: ICD-10-CM

## 2022-10-17 DIAGNOSIS — M76.61 ACHILLES TENDINITIS OF RIGHT LOWER EXTREMITY: Primary | ICD-10-CM

## 2022-10-17 DIAGNOSIS — M24.573 EQUINUS CONTRACTURE OF ANKLE: ICD-10-CM

## 2022-10-17 PROCEDURE — 99212 OFFICE O/P EST SF 10 MIN: CPT

## 2022-10-17 NOTE — PROGRESS NOTES
10/17/2022  Foot and Ankle Surgery - Established Patient/Follow-up  Provider: HIEU Gonzales   Location: [facility]    Subjective:  Alpesh Nicholas is a 22 y.o. female.     Chief Complaint   Patient presents with   • Right Ankle - Pain   • Right Lower Leg - Pain   • Follow-up     CASSIDY Meadows md  5/12/2022       The patient is a 22-year-old female who presents to the clinic for follow-up of right lower extremity Achilles tendonitis pain.    She acknowledges coming into the clinic approximately 2 weeks ago and notes in the beginning, while wearing the boot, it felt as if her condition was worsening. She notes it improved after wearing it for some time and has began wearing more comfortable shoes. She adds she was off last week, which did help improve her condition, as well. She denies receiving stretching exercises except drawing ABC's. Additionally, she denies any other concerns.    No Known Allergies    Current Outpatient Medications on File Prior to Visit   Medication Sig Dispense Refill   • azelastine (ASTEPRO) 0.15 % solution nasal spray 2 sprays into the nostril(s) as directed by provider 2 (Two) Times a Day. 30 mL 0   • citalopram (CeleXA) 20 MG tablet Take 1 tablet by mouth Daily. 90 tablet 0   • dicyclomine (BENTYL) 20 MG tablet Take 1 tablet by mouth 4 (Four) Times a Day As Needed (abdominal pain). 30 tablet 0   • doxycycline (MONODOX) 100 MG capsule Take 1 capsule by mouth 2 (Two) Times a Day. 28 capsule 0   • fexofenadine (Allegra Allergy) 180 MG tablet Take 1 tablet by mouth Daily. 30 tablet 0   • fexofenadine (ALLEGRA) 180 MG tablet Take 1 tablet by mouth Daily.     • fluticasone (FLONASE) 50 MCG/ACT nasal spray 2 sprays into the nostril(s) as directed by provider Daily. 16 g 0   • naproxen (EC NAPROSYN) 500 MG EC tablet Take 1 tablet by mouth 2 (Two) Times a Day With Meals. 14 tablet 0   • omeprazole (priLOSEC) 20 MG capsule Take 20 mg by mouth Daily.     • triamcinolone (KENALOG) 0.1 %  "cream Apply 1 application topically to the appropriate area as directed 2 (Two) Times a Day. 28.4 g 0   • Vienva 0.1-20 MG-MCG per tablet Take 1 tablet by mouth Daily.     • [DISCONTINUED] HYDROcodone-acetaminophen (NORCO) 5-325 MG per tablet Take 1 tablet by mouth Every 6 (Six) Hours As Needed for Moderate Pain. 12 tablet 0   • [DISCONTINUED] ondansetron ODT (ZOFRAN-ODT) 4 MG disintegrating tablet Place 1 tablet on the tongue Every 8 (Eight) Hours As Needed for Nausea or Vomiting. 20 tablet 0     No current facility-administered medications on file prior to visit.       Objective   Resp 20   Ht 157.5 cm (62\")   Wt 79.4 kg (175 lb)   LMP 09/23/2022   BMI 32.01 kg/m²     Foot/Ankle Exam:       General:   Appearance: appears stated age and healthy    Orientation: AAOx3      VASCULAR      Right Foot Vascularity   Normal vascular exam    Dorsalis pedis:  2+  Posterior tibial:  2+  Skin Temperature: warm    Edema Grading:  None  CFT:  < 3 seconds  Pedal Hair Growth:  Present  Varicosities: none        NEUROLOGIC     Right Foot Neurologic   Normal sensation    Light touch sensation:  Normal  Vibratory sensation:  Normal  Hot/Cold sensation: normal    Normal reflexes    Achilles reflex:  2+  Babinski reflex:  2+     MUSCULOSKELETAL      Right Foot Musculoskeletal   Ecchymosis:  None  Tenderness: none       MUSCLE STRENGTH     Right Foot Muscle Strength   Normal strength    Foot dorsiflexion:  5  Foot plantar flexion:  5  Foot inversion:  5  Foot eversion:  5     DERMATOLOGIC     Right Foot Dermatologic   Skin: skin intact    Nails: normal        Right Foot Additional Comments Pain has subsided since previous appointment.        Assessment & Plan   Diagnoses and all orders for this visit:    1. Achilles tendinitis of right lower extremity (Primary)    2. Equinus contracture of ankle    3. Suzette's deformity of right heel      The patient was given calf stretching exercises to help limber calf muscle and decrease " forefoot pressure. She may follow up on as needed basis.    No orders of the defined types were placed in this encounter.        Transcribed from ambient dictation for HIEU Bates by Christine Peters.  10/17/22   10:57 EDT    Patient or patient representative verbalized consent to the visit recording.  I have personally performed the services described in this document as transcribed by the above individual, and it is both accurate and complete.

## 2022-10-19 DIAGNOSIS — F41.9 ANXIETY: ICD-10-CM

## 2022-10-19 RX ORDER — CITALOPRAM 20 MG/1
20 TABLET ORAL DAILY
Qty: 90 TABLET | Refills: 0 | Status: SHIPPED | OUTPATIENT
Start: 2022-10-19 | End: 2023-01-07 | Stop reason: SDUPTHER

## 2022-12-05 ENCOUNTER — OFFICE VISIT (OUTPATIENT)
Dept: FAMILY MEDICINE CLINIC | Facility: CLINIC | Age: 22
End: 2022-12-05

## 2022-12-05 VITALS
HEART RATE: 79 BPM | DIASTOLIC BLOOD PRESSURE: 82 MMHG | WEIGHT: 194 LBS | TEMPERATURE: 98.3 F | OXYGEN SATURATION: 98 % | HEIGHT: 61 IN | BODY MASS INDEX: 36.63 KG/M2 | SYSTOLIC BLOOD PRESSURE: 124 MMHG | RESPIRATION RATE: 16 BRPM

## 2022-12-05 DIAGNOSIS — Z23 NEED FOR VACCINATION: ICD-10-CM

## 2022-12-05 DIAGNOSIS — J06.9 ACUTE URI: Primary | ICD-10-CM

## 2022-12-05 PROCEDURE — 90686 IIV4 VACC NO PRSV 0.5 ML IM: CPT | Performed by: FAMILY MEDICINE

## 2022-12-05 PROCEDURE — 99213 OFFICE O/P EST LOW 20 MIN: CPT | Performed by: FAMILY MEDICINE

## 2022-12-05 PROCEDURE — 90471 IMMUNIZATION ADMIN: CPT | Performed by: FAMILY MEDICINE

## 2022-12-05 RX ORDER — AZITHROMYCIN 250 MG/1
250 TABLET, FILM COATED ORAL DAILY
Qty: 6 TABLET | Refills: 0 | Status: SHIPPED | OUTPATIENT
Start: 2022-12-05 | End: 2022-12-10

## 2022-12-05 NOTE — PROGRESS NOTES
Subjective   Chief Complaint   Patient presents with   • Nasal Congestion   • Cough   • Earache   • Sore Throat     Green mucus     Alpesh Nicholas is a 22 y.o. female.     Patient Care Team:  Joy Meadows MD as PCP - General  Silke Smith MD as Consulting Physician (Obstetrics and Gynecology)  Silvestre Calvin MD (Dermatology)    History of Present Illness  She is coming in today due to upper respiratory symptoms, which she has been dealing with for the last week or so.  She reports some congestion, sinus pressure, some earache and sore throat, she also has been having some green nasal discharge.  She got tested last week after symptoms started for COVID-19 and it was negative.  No fever or chills are being reported.  No vomiting or diarrhea.  She already is taking over-the-counter medicines and using the nasal spray.  She would like to get flu shot today.       The following portions of the patient's history were reviewed and updated as appropriate: allergies, current medications, past family history, past social history, past surgical history and problem list.  Past Medical History:   Diagnosis Date   • Allergic rhinitis    • Arachnoid cyst    • Concussion    • Fatty liver    • GERD (gastroesophageal reflux disease)      Past Surgical History:   Procedure Laterality Date   • CYST REMOVAL      neck     The patient has a family history of  Family History   Problem Relation Age of Onset   • Diabetes Maternal Grandmother    • Diabetes Maternal Grandfather    • Lung cancer Paternal Grandfather      Social History     Socioeconomic History   • Marital status:    Tobacco Use   • Smoking status: Never   • Smokeless tobacco: Never   Vaping Use   • Vaping Use: Never used   Substance and Sexual Activity   • Alcohol use: Yes     Comment: occasionally   • Drug use: No   • Sexual activity: Defer       Review of Systems   Constitutional: Negative for activity change, appetite change, chills and fever.  "  HENT: Positive for congestion, ear pain and sore throat. Negative for postnasal drip, sinus pressure and swollen glands.    Respiratory: Positive for cough. Negative for choking, chest tightness, shortness of breath, wheezing and stridor.    Cardiovascular: Negative for chest pain.   Skin: Negative for dry skin and rash.     Visit Vitals  /82 (BP Location: Left arm, Patient Position: Sitting, Cuff Size: Adult)   Pulse 79   Temp 98.3 °F (36.8 °C) (Temporal)   Resp 16   Ht 154.9 cm (61\")   Wt 88 kg (194 lb)   SpO2 98%   BMI 36.66 kg/m²       Current Outpatient Medications:   •  azelastine (ASTEPRO) 0.15 % solution nasal spray, 2 sprays into the nostril(s) as directed by provider 2 (Two) Times a Day., Disp: 30 mL, Rfl: 0  •  citalopram (CeleXA) 20 MG tablet, Take 1 tablet by mouth Daily., Disp: 90 tablet, Rfl: 0  •  fexofenadine (Allegra Allergy) 180 MG tablet, Take 1 tablet by mouth Daily., Disp: 30 tablet, Rfl: 0  •  fluticasone (FLONASE) 50 MCG/ACT nasal spray, 2 sprays into the nostril(s) as directed by provider Daily., Disp: 16 g, Rfl: 0  •  omeprazole (priLOSEC) 20 MG capsule, Take 20 mg by mouth Daily., Disp: , Rfl:   •  azithromycin (ZITHROMAX) 250 MG tablet, Take 1 tablet by mouth Daily for 5 days. Take 2 tablets the first day, then 1 tablet daily for 4 days., Disp: 6 tablet, Rfl: 0  •  dicyclomine (BENTYL) 20 MG tablet, Take 1 tablet by mouth 4 (Four) Times a Day As Needed (abdominal pain)., Disp: 30 tablet, Rfl: 0  •  doxycycline (MONODOX) 100 MG capsule, Take 1 capsule by mouth 2 (Two) Times a Day., Disp: 28 capsule, Rfl: 0  •  fexofenadine (ALLEGRA) 180 MG tablet, Take 1 tablet by mouth Daily., Disp: , Rfl:   •  naproxen (EC NAPROSYN) 500 MG EC tablet, Take 1 tablet by mouth 2 (Two) Times a Day With Meals., Disp: 14 tablet, Rfl: 0  •  triamcinolone (KENALOG) 0.1 % cream, Apply 1 application topically to the appropriate area as directed 2 (Two) Times a Day., Disp: 28.4 g, Rfl: 0    Objective "   Physical Exam  Vitals and nursing note reviewed.   Constitutional:       General: She is not in acute distress.     Appearance: She is well-developed.   HENT:      Head: Normocephalic and atraumatic.      Right Ear: External ear normal.      Left Ear: External ear normal.      Ears:      Comments: Some palpation tenderness over maxillary sinuses bilaterally.     Mouth/Throat:      Pharynx: No oropharyngeal exudate.   Eyes:      Conjunctiva/sclera: Conjunctivae normal.      Pupils: Pupils are equal, round, and reactive to light.   Cardiovascular:      Rate and Rhythm: Normal rate and regular rhythm.      Heart sounds: Normal heart sounds.   Pulmonary:      Effort: Pulmonary effort is normal. No respiratory distress.      Breath sounds: Normal breath sounds. No wheezing or rales.   Musculoskeletal:      Cervical back: Normal range of motion and neck supple.   Skin:     General: Skin is warm and dry.      Findings: No rash.         Assessment & Plan   Diagnoses and all orders for this visit:    1. Acute URI (Primary)  -     azithromycin (ZITHROMAX) 250 MG tablet; Take 1 tablet by mouth Daily for 5 days. Take 2 tablets the first day, then 1 tablet daily for 4 days.  Dispense: 6 tablet; Refill: 0    2. Need for vaccination  -     FluLaval/Fluzone >6 mos ([id number])      I gave her prescription for Z-Jere.  She is to continue her over-the-counter medications as well as a nasal spray.  She is to monitor her symptoms and contact us back if any concerns.    Return if symptoms worsen or fail to improve, for Recheck.    Requested Prescriptions     Signed Prescriptions Disp Refills   • azithromycin (ZITHROMAX) 250 MG tablet 6 tablet 0     Sig: Take 1 tablet by mouth Daily for 5 days. Take 2 tablets the first day, then 1 tablet daily for 4 days.

## 2022-12-29 DIAGNOSIS — J06.9 ACUTE URI: ICD-10-CM

## 2022-12-29 RX ORDER — FLUTICASONE PROPIONATE 50 MCG
2 SPRAY, SUSPENSION (ML) NASAL DAILY
Qty: 16 G | Refills: 0 | Status: SHIPPED | OUTPATIENT
Start: 2022-12-29 | End: 2023-04-02 | Stop reason: SDUPTHER

## 2022-12-30 ENCOUNTER — OFFICE VISIT (OUTPATIENT)
Dept: FAMILY MEDICINE CLINIC | Facility: CLINIC | Age: 22
End: 2022-12-30
Payer: COMMERCIAL

## 2022-12-30 DIAGNOSIS — H66.003 NON-RECURRENT ACUTE SUPPURATIVE OTITIS MEDIA OF BOTH EARS WITHOUT SPONTANEOUS RUPTURE OF TYMPANIC MEMBRANES: Primary | ICD-10-CM

## 2022-12-30 PROCEDURE — 99213 OFFICE O/P EST LOW 20 MIN: CPT | Performed by: FAMILY MEDICINE

## 2022-12-30 RX ORDER — AMOXICILLIN 500 MG/1
500 CAPSULE ORAL 3 TIMES DAILY
Qty: 30 CAPSULE | Refills: 0 | Status: SHIPPED | OUTPATIENT
Start: 2022-12-30 | End: 2023-03-14

## 2022-12-30 NOTE — PROGRESS NOTES
"Chief Complaint  Nasal Congestion (Green mucus x 3 qd ), Ear Fullness, and Fatigue    Subjective        Alpesh Nicholas presents to [facility]  Ear Fullness   There is pain in both ears. This is a recurrent problem. The current episode started in the past 7 days. The problem occurs constantly. The problem has been unchanged. There has been no fever. The pain is moderate. Associated symptoms include rhinorrhea and a sore throat. Pertinent negatives include no coughing. She has tried nothing for the symptoms. The treatment provided no relief.   Fatigue  Associated symptoms include fatigue and a sore throat. Pertinent negatives include no coughing.       Objective   Vital Signs:  /77 (BP Location: Left arm, Patient Position: Sitting, Cuff Size: Large Adult)   Pulse 98   Temp 97.8 °F (36.6 °C) (Infrared)   Ht 157.5 cm (62\")   Wt 91.6 kg (202 lb)   SpO2 96%   BMI 36.95 kg/m²   Estimated body mass index is 36.95 kg/m² as calculated from the following:    Height as of this encounter: 157.5 cm (62\").    Weight as of this encounter: 91.6 kg (202 lb).          Physical Exam  Constitutional:       General: She is not in acute distress.     Appearance: She is well-developed.   HENT:      Head: Normocephalic.      Right Ear: Tympanic membrane is erythematous.      Left Ear: Tympanic membrane is erythematous.   Eyes:      General: Lids are normal.      Conjunctiva/sclera: Conjunctivae normal.   Neck:      Thyroid: No thyroid mass or thyromegaly.      Trachea: Trachea normal.   Cardiovascular:      Rate and Rhythm: Normal rate and regular rhythm.      Heart sounds: Normal heart sounds.   Pulmonary:      Effort: Pulmonary effort is normal.      Breath sounds: Normal breath sounds.   Musculoskeletal:      Cervical back: Normal range of motion.   Lymphadenopathy:      Cervical: No cervical adenopathy.   Skin:     General: Skin is warm and dry.   Neurological:      Mental Status: She is alert " and oriented to person, place, and time.   Psychiatric:         Attention and Perception: She is attentive.         Mood and Affect: Mood normal.         Speech: Speech normal.         Behavior: Behavior normal.        Result Review :  The following data was reviewed by: No Frost MD on 12/30/2022:  Common labs    Common Labs 9/24/22 9/24/22    0429 [id number]   Glucose  118 (A)   BUN  10   Creatinine  0.76   Sodium  139   Potassium  3.9   Chloride  102   Calcium  10.5   Albumin  4.47   Total Bilirubin  0.4   Alkaline Phosphatase  74   AST (SGOT)  17   ALT (SGPT)  16   WBC 14.25 (A)    Hemoglobin 13.7    Hematocrit 41.7    Platelets 355    (A) Abnormal value       Comments are available for some flowsheets but are not being displayed.                     Assessment and Plan   Diagnoses and all orders for this visit:    1. Non-recurrent acute suppurative otitis media of both ears without spontaneous rupture of tympanic membranes (Primary)  -     amoxicillin (AMOXIL) 500 MG capsule; Take 1 capsule by mouth 3 (Three) Times a Day.  Dispense: 30 capsule; Refill: 0             Follow Up   No follow-ups on file.  Patient was given instructions and counseling regarding her condition or for health maintenance advice. Please see specific information pulled into the AVS if appropriate.

## 2023-01-07 DIAGNOSIS — F41.9 ANXIETY: ICD-10-CM

## 2023-01-08 RX ORDER — CITALOPRAM 20 MG/1
20 TABLET ORAL DAILY
Qty: 90 TABLET | Refills: 0 | Status: SHIPPED | OUTPATIENT
Start: 2023-01-08

## 2023-01-15 VITALS
TEMPERATURE: 97.8 F | WEIGHT: 202 LBS | OXYGEN SATURATION: 96 % | DIASTOLIC BLOOD PRESSURE: 77 MMHG | SYSTOLIC BLOOD PRESSURE: 110 MMHG | HEIGHT: 62 IN | HEART RATE: 98 BPM | BODY MASS INDEX: 37.17 KG/M2

## 2023-03-14 RX ORDER — AMOXICILLIN AND CLAVULANATE POTASSIUM 875; 125 MG/1; MG/1
1 TABLET, FILM COATED ORAL 2 TIMES DAILY
Qty: 20 TABLET | Refills: 0 | Status: SHIPPED | OUTPATIENT
Start: 2023-03-14 | End: 2023-04-06

## 2023-04-02 DIAGNOSIS — J06.9 ACUTE URI: ICD-10-CM

## 2023-04-03 RX ORDER — FLUTICASONE PROPIONATE 50 MCG
2 SPRAY, SUSPENSION (ML) NASAL DAILY
Qty: 16 G | Refills: 3 | Status: SHIPPED | OUTPATIENT
Start: 2023-04-03

## 2023-04-06 ENCOUNTER — OFFICE VISIT (OUTPATIENT)
Dept: FAMILY MEDICINE CLINIC | Facility: CLINIC | Age: 23
End: 2023-04-06
Payer: MEDICAID

## 2023-04-06 VITALS
SYSTOLIC BLOOD PRESSURE: 122 MMHG | RESPIRATION RATE: 16 BRPM | TEMPERATURE: 97.5 F | OXYGEN SATURATION: 97 % | WEIGHT: 196.6 LBS | HEART RATE: 93 BPM | HEIGHT: 62 IN | DIASTOLIC BLOOD PRESSURE: 84 MMHG | BODY MASS INDEX: 36.18 KG/M2

## 2023-04-06 DIAGNOSIS — R05.2 SUBACUTE COUGH: ICD-10-CM

## 2023-04-06 DIAGNOSIS — J01.00 SUBACUTE MAXILLARY SINUSITIS: Primary | ICD-10-CM

## 2023-04-06 RX ORDER — BENZONATATE 200 MG/1
200 CAPSULE ORAL 3 TIMES DAILY PRN
Qty: 30 CAPSULE | Refills: 0 | Status: SHIPPED | OUTPATIENT
Start: 2023-04-06 | End: 2023-04-16

## 2023-04-06 RX ORDER — METHYLPREDNISOLONE 4 MG/1
TABLET ORAL
Qty: 1 EACH | Refills: 0 | Status: SHIPPED | OUTPATIENT
Start: 2023-04-06

## 2023-04-06 NOTE — PROGRESS NOTES
Subjective   Chief Complaint   Patient presents with   • Nasal Congestion   • Ear Fullness     COUGH/ NEGATIVE COVID X 3 WEEKS     Alpesh Nicholas is a 22 y.o. female.     Patient Care Team:  Joy Meadows MD as PCP - General  Silke Smith MD as Consulting Physician (Obstetrics and Gynecology)  Silvestre Calvin MD (Dermatology)    History of Present Illness  She is coming in today due to upper respiratory infection which she has been dealing with for over 3 weeks.  She was treated with a course of Augmentin last month, however she still has a lot of congestion and cough.  She used to have yellow drainage, now it is white but thick.  The cough is mainly present throughout the day.  No fever, chest pains, or difficulty breathing are being reported.  She took home COVID-19 test which was negative.  She has been using Flonase over-the-counter.  She has tried several different cough medicines over-the-counter without any relief.       The following portions of the patient's history were reviewed and updated as appropriate: allergies, current medications, past family history, past medical history, past social history, past surgical history and problem list.  Past Medical History:   Diagnosis Date   • Allergic rhinitis    • Arachnoid cyst    • Concussion    • Fatty liver    • GERD (gastroesophageal reflux disease)      Past Surgical History:   Procedure Laterality Date   • CYST REMOVAL      neck     The patient has a family history of  Family History   Problem Relation Age of Onset   • Diabetes Maternal Grandmother    • Diabetes Maternal Grandfather    • Lung cancer Paternal Grandfather      Social History     Socioeconomic History   • Marital status:    Tobacco Use   • Smoking status: Never   • Smokeless tobacco: Never   Vaping Use   • Vaping Use: Never used   Substance and Sexual Activity   • Alcohol use: Yes     Comment: occasionally   • Drug use: No   • Sexual activity: Defer       Review of  "Systems   Constitutional: Negative for activity change, appetite change, chills and fever.   HENT: Positive for congestion and postnasal drip. Negative for ear pain, sinus pressure, sore throat and swollen glands.    Respiratory: Positive for cough. Negative for choking, chest tightness, shortness of breath, wheezing and stridor.    Cardiovascular: Negative for chest pain.   Skin: Negative for dry skin and rash.     Visit Vitals  /84 (BP Location: Left arm, Patient Position: Sitting, Cuff Size: Adult)   Pulse 93   Temp 97.5 °F (36.4 °C) (Infrared)   Resp 16   Ht 157.5 cm (62\")   Wt 89.2 kg (196 lb 9.6 oz)   LMP 03/05/2023 (Approximate)   SpO2 97%   BMI 35.96 kg/m²       Class 2 Severe Obesity (BMI >=35 and <=39.9). Obesity-related health conditions include the following: none. Obesity is unchanged. BMI is is above average; BMI management plan is completed. We discussed portion control and increasing exercise.      Current Outpatient Medications:   •  citalopram (CeleXA) 20 MG tablet, Take 1 tablet by mouth Daily., Disp: 90 tablet, Rfl: 0  •  fexofenadine (Allegra Allergy) 180 MG tablet, Take 1 tablet by mouth Daily., Disp: 30 tablet, Rfl: 0  •  fluticasone (FLONASE) 50 MCG/ACT nasal spray, 2 sprays into the nostril(s) as directed by provider Daily., Disp: 16 g, Rfl: 3  •  omeprazole (priLOSEC) 20 MG capsule, Take 1 capsule by mouth Daily., Disp: , Rfl:   •  benzonatate (TESSALON) 200 MG capsule, Take 1 capsule by mouth 3 (Three) Times a Day As Needed for Cough for up to 10 days., Disp: 30 capsule, Rfl: 0  •  methylPREDNISolone (Medrol) 4 MG dose pack, Take as directed on package instructions., Disp: 1 each, Rfl: 0    Objective   Physical Exam  Vitals and nursing note reviewed.   Constitutional:       General: She is not in acute distress.     Appearance: She is well-developed.   HENT:      Head: Normocephalic and atraumatic.      Right Ear: External ear normal.      Left Ear: External ear normal.      Nose: " Congestion present.      Mouth/Throat:      Pharynx: Posterior oropharyngeal erythema present. No oropharyngeal exudate.   Eyes:      Conjunctiva/sclera: Conjunctivae normal.      Pupils: Pupils are equal, round, and reactive to light.   Cardiovascular:      Rate and Rhythm: Normal rate and regular rhythm.      Heart sounds: Normal heart sounds.   Pulmonary:      Effort: Pulmonary effort is normal. No respiratory distress.      Breath sounds: Normal breath sounds. No wheezing or rales.   Musculoskeletal:      Cervical back: Normal range of motion and neck supple.   Skin:     General: Skin is warm and dry.      Findings: No rash.         Assessment & Plan   Diagnoses and all orders for this visit:    1. Subacute maxillary sinusitis (Primary)  -     methylPREDNISolone (Medrol) 4 MG dose pack; Take as directed on package instructions.  Dispense: 1 each; Refill: 0    2. Subacute cough  -     benzonatate (TESSALON) 200 MG capsule; Take 1 capsule by mouth 3 (Three) Times a Day As Needed for Cough for up to 10 days.  Dispense: 30 capsule; Refill: 0      I will be starting her on a course of a steroid pack.  She is to continue Flonase over-the-counter.  I also gave her some cough medicine.  She is to monitor her symptoms and contact us back if any concerns.        Return if symptoms worsen or fail to improve, for Recheck.    Requested Prescriptions     Signed Prescriptions Disp Refills   • methylPREDNISolone (Medrol) 4 MG dose pack 1 each 0     Sig: Take as directed on package instructions.   • benzonatate (TESSALON) 200 MG capsule 30 capsule 0     Sig: Take 1 capsule by mouth 3 (Three) Times a Day As Needed for Cough for up to 10 days.

## 2023-04-27 DIAGNOSIS — F41.9 ANXIETY: ICD-10-CM

## 2023-04-27 RX ORDER — CITALOPRAM 20 MG/1
20 TABLET ORAL DAILY
Qty: 90 TABLET | Refills: 1 | Status: SHIPPED | OUTPATIENT
Start: 2023-04-27

## 2023-08-10 ENCOUNTER — OFFICE VISIT (OUTPATIENT)
Dept: FAMILY MEDICINE CLINIC | Facility: CLINIC | Age: 23
End: 2023-08-10
Payer: COMMERCIAL

## 2023-08-10 VITALS
OXYGEN SATURATION: 97 % | RESPIRATION RATE: 16 BRPM | HEART RATE: 81 BPM | HEIGHT: 62 IN | BODY MASS INDEX: 37.17 KG/M2 | SYSTOLIC BLOOD PRESSURE: 135 MMHG | WEIGHT: 202 LBS | TEMPERATURE: 97.1 F | DIASTOLIC BLOOD PRESSURE: 82 MMHG

## 2023-08-10 DIAGNOSIS — Z23 NEED FOR VACCINATION: ICD-10-CM

## 2023-08-10 DIAGNOSIS — Z00.00 PREVENTATIVE HEALTH CARE: Primary | ICD-10-CM

## 2023-08-10 PROBLEM — J06.9 UPPER RESPIRATORY TRACT INFECTION DUE TO COVID-19 VIRUS: Status: RESOLVED | Noted: 2021-12-14 | Resolved: 2023-08-10

## 2023-08-10 PROBLEM — U07.1 UPPER RESPIRATORY TRACT INFECTION DUE TO COVID-19 VIRUS: Status: RESOLVED | Noted: 2021-12-14 | Resolved: 2023-08-10

## 2023-08-10 PROBLEM — J01.00 SUBACUTE MAXILLARY SINUSITIS: Status: RESOLVED | Noted: 2019-11-07 | Resolved: 2023-08-10

## 2023-08-10 PROBLEM — J06.9 ACUTE URI: Status: RESOLVED | Noted: 2021-03-04 | Resolved: 2023-08-10

## 2023-08-10 RX ORDER — CITALOPRAM HYDROBROMIDE 10 MG/1
10 TABLET ORAL DAILY
Qty: 90 TABLET | Refills: 0 | Status: SHIPPED | OUTPATIENT
Start: 2023-08-10

## 2023-08-10 NOTE — PROGRESS NOTES
Subjective   Chief Complaint   Patient presents with    Annual Exam     Alpesh Nicholas is a 23 y.o. female.     Patient Care Team:  Joy Meadows MD as PCP -   LuisSilke MD as Consulting Physician (Obstetrics and Gynecology)  Silvestre Calvin MD (Dermatology)    History of Present Illness  She is coming in today for her annual wellness exam.  Patient is currently on citalopram 20 mg and she has been on it for couple of years for her anxiety.  She really would like to try to come off the medication.  She has gained about 40 pounds over the last 2 years or so and she is concerned about her weight gain.  She would like to get some fasting blood work.  She does not feel that her lifestyle is that much difference than it was previously.  She also has been experiencing some tiredness and fatigue.  She wakes up tired in the morning even though she sleeps well through the night.  Her  made comment in the past that she snores, but not all the time. Patient does not report any chest pain, shortness of breath, dizziness, nausea, vomiting, or diarrhea, visual issues, headaches, numbness or tingling. No urinary issues reported like urgency, frequency, or discomfort upon urination.  No significant weight changes reported.  No swelling reported.  No rashes or any other skin issues reported. No emotional issues or insomnia.       The following portions of the patient's history were reviewed and updated as appropriate: allergies, current medications, past family history, past medical history, past social history, past surgical history, and problem list.  Past Medical History:   Diagnosis Date    Allergic rhinitis     Arachnoid cyst     Concussion     Fatty liver     GERD (gastroesophageal reflux disease)      Past Surgical History:   Procedure Laterality Date    CYST REMOVAL      neck     The patient has a family history of  Family History   Problem Relation Age of Onset    Diabetes Maternal  Grandmother     Diabetes Maternal Grandfather     Lung cancer Paternal Grandfather      Social History     Socioeconomic History    Marital status:    Tobacco Use    Smoking status: Never    Smokeless tobacco: Never   Vaping Use    Vaping Use: Never used   Substance and Sexual Activity    Alcohol use: Yes     Comment: occasionally    Drug use: No    Sexual activity: Defer       Review of Systems   Constitutional:  Negative for activity change, appetite change, chills, fatigue, fever, unexpected weight gain and unexpected weight loss.   HENT:  Negative for congestion, ear pain, hearing loss, nosebleeds, postnasal drip, swollen glands, tinnitus and trouble swallowing.    Eyes:  Negative for blurred vision, double vision and visual disturbance.   Respiratory:  Negative for cough, choking, chest tightness, shortness of breath, wheezing and stridor.    Cardiovascular:  Negative for chest pain, palpitations and leg swelling.   Gastrointestinal:  Negative for abdominal distention, abdominal pain, anal bleeding, constipation, diarrhea, nausea, vomiting and GERD.   Endocrine: Negative for cold intolerance, heat intolerance and polyphagia.   Genitourinary:  Negative for dysuria, flank pain, frequency, hematuria and urgency.   Musculoskeletal:  Negative for arthralgias, back pain, gait problem, joint swelling and neck pain.   Skin:  Negative for color change, dry skin and skin lesions.   Allergic/Immunologic: Negative for environmental allergies and food allergies.   Neurological:  Negative for dizziness, tremors, speech difficulty, light-headedness, numbness, headache and confusion.   Hematological:  Negative for adenopathy. Does not bruise/bleed easily.   Psychiatric/Behavioral:  Negative for decreased concentration, sleep disturbance, depressed mood and stress.    Visit Vitals  /82 (BP Location: Left arm, Patient Position: Sitting, Cuff Size: Adult)   Pulse 81   Temp 97.1 øF (36.2 øC) (Infrared)   Resp 16   Ht  "157.5 cm (62\")   Wt 91.6 kg (202 lb)   LMP 08/04/2023 (Approximate)   SpO2 97%   BMI 36.95 kg/mý              Current Outpatient Medications:     fexofenadine (Allegra Allergy) 180 MG tablet, Take 1 tablet by mouth Daily., Disp: 30 tablet, Rfl: 0    fluticasone (FLONASE) 50 MCG/ACT nasal spray, 2 sprays into the nostril(s) as directed by provider Daily., Disp: 16 g, Rfl: 3    citalopram (CeleXA) 10 MG tablet, Take 1 tablet by mouth Daily., Disp: 90 tablet, Rfl: 0    Objective   Physical Exam  Vitals and nursing note reviewed.   Constitutional:       General: She is not in acute distress.     Appearance: She is well-developed. She is not diaphoretic.   HENT:      Head: Normocephalic and atraumatic.      Right Ear: External ear normal.      Left Ear: External ear normal.   Eyes:      General: No scleral icterus.        Right eye: No discharge.         Left eye: No discharge.      Conjunctiva/sclera: Conjunctivae normal.      Pupils: Pupils are equal, round, and reactive to light.   Neck:      Thyroid: No thyromegaly.      Vascular: No JVD.   Cardiovascular:      Rate and Rhythm: Normal rate and regular rhythm.      Heart sounds: Normal heart sounds. No murmur heard.    No friction rub. No gallop.   Pulmonary:      Effort: Pulmonary effort is normal. No respiratory distress.      Breath sounds: Normal breath sounds. No stridor. No wheezing, rhonchi or rales.   Abdominal:      General: Bowel sounds are normal. There is no distension.      Palpations: Abdomen is soft. There is no mass.      Tenderness: There is no abdominal tenderness. There is no guarding or rebound.      Hernia: No hernia is present.   Musculoskeletal:         General: No swelling, tenderness or deformity. Normal range of motion.      Cervical back: Normal range of motion and neck supple. No muscular tenderness.      Right lower leg: No edema.      Left lower leg: No edema.   Lymphadenopathy:      Cervical: No cervical adenopathy.   Skin:     General: " Skin is warm and dry.      Capillary Refill: Capillary refill takes less than 2 seconds.      Coloration: Skin is not pale.      Findings: No bruising, erythema, lesion or rash.   Neurological:      General: No focal deficit present.      Mental Status: She is alert and oriented to person, place, and time.      Cranial Nerves: No cranial nerve deficit.      Sensory: No sensory deficit.      Motor: No weakness.      Coordination: Coordination normal.      Deep Tendon Reflexes: Reflexes normal.   Psychiatric:         Mood and Affect: Mood normal.         Behavior: Behavior normal.         Judgment: Judgment normal.     Assessment & Plan   Diagnoses and all orders for this visit:    1. Preventative health care (Primary)  -     CBC Auto Differential  -     Comprehensive Metabolic Panel  -     TSH  -     Vitamin D,25-Hydroxy  -     Lipid Panel  -     Vitamin B12  -     Hemoglobin A1c    2. Need for vaccination  -     Tdap Vaccine => 8yo IM (BOOSTRIX)    Other orders  -     citalopram (CeleXA) 10 MG tablet; Take 1 tablet by mouth Daily.  Dispense: 90 tablet; Refill: 0      Wellness exam was done today - see above for details. Healthy life style was reviewed and discussed and re-enforced. Regular exercise and healthy diet were also discussed and recommended.  I will be getting fasting blood work.  Patient is to see her gynecologist in near future for her gynecological checkup.  She was given Tdap today.  Patient would like to, citalopram and she was instructed on how to do it.  I will be furs decreasing citalopram from 20 mg to 10 mg.  I also advised her to start taking prenatal vitamins as she is sexually active.  She definitely would benefit from weight loss and lowering her BMI, she has gained about 40 pounds over the last couple of years.      Return in about 1 year (around 8/10/2024) for Annual physical.    Requested Prescriptions     Signed Prescriptions Disp Refills    citalopram (CeleXA) 10 MG tablet 90 tablet 0      Sig: Take 1 tablet by mouth Daily.

## 2023-08-15 ENCOUNTER — LAB (OUTPATIENT)
Dept: FAMILY MEDICINE CLINIC | Facility: CLINIC | Age: 23
End: 2023-08-15
Payer: COMMERCIAL

## 2023-08-15 LAB
25(OH)D3 SERPL-MCNC: 22.9 NG/ML (ref 30–100)
ALBUMIN SERPL-MCNC: 4.6 G/DL (ref 3.5–5.2)
ALBUMIN/GLOB SERPL: 1.8 G/DL
ALP SERPL-CCNC: 82 U/L (ref 39–117)
ALT SERPL W P-5'-P-CCNC: 15 U/L (ref 1–33)
ANION GAP SERPL CALCULATED.3IONS-SCNC: 13 MMOL/L (ref 5–15)
AST SERPL-CCNC: 18 U/L (ref 1–32)
BASOPHILS # BLD AUTO: 0.04 10*3/MM3 (ref 0–0.2)
BASOPHILS NFR BLD AUTO: 0.6 % (ref 0–1.5)
BILIRUB SERPL-MCNC: 0.5 MG/DL (ref 0–1.2)
BUN SERPL-MCNC: 10 MG/DL (ref 6–20)
BUN/CREAT SERPL: 14.5 (ref 7–25)
CALCIUM SPEC-SCNC: 9.8 MG/DL (ref 8.6–10.5)
CHLORIDE SERPL-SCNC: 102 MMOL/L (ref 98–107)
CHOLEST SERPL-MCNC: 176 MG/DL (ref 0–200)
CO2 SERPL-SCNC: 24 MMOL/L (ref 22–29)
CREAT SERPL-MCNC: 0.69 MG/DL (ref 0.57–1)
DEPRECATED RDW RBC AUTO: 39.3 FL (ref 37–54)
EGFRCR SERPLBLD CKD-EPI 2021: 123.7 ML/MIN/1.73
EOSINOPHIL # BLD AUTO: 0.13 10*3/MM3 (ref 0–0.4)
EOSINOPHIL NFR BLD AUTO: 2 % (ref 0.3–6.2)
ERYTHROCYTE [DISTWIDTH] IN BLOOD BY AUTOMATED COUNT: 13.2 % (ref 12.3–15.4)
GLOBULIN UR ELPH-MCNC: 2.6 GM/DL
GLUCOSE SERPL-MCNC: 84 MG/DL (ref 65–99)
HBA1C MFR BLD: 5.7 % (ref 4.8–5.6)
HCT VFR BLD AUTO: 41.7 % (ref 34–46.6)
HDLC SERPL-MCNC: 43 MG/DL (ref 40–60)
HGB BLD-MCNC: 13.7 G/DL (ref 12–15.9)
IMM GRANULOCYTES # BLD AUTO: 0.01 10*3/MM3 (ref 0–0.05)
IMM GRANULOCYTES NFR BLD AUTO: 0.2 % (ref 0–0.5)
LDLC SERPL CALC-MCNC: 112 MG/DL (ref 0–100)
LDLC/HDLC SERPL: 2.55 {RATIO}
LYMPHOCYTES # BLD AUTO: 2.14 10*3/MM3 (ref 0.7–3.1)
LYMPHOCYTES NFR BLD AUTO: 33.6 % (ref 19.6–45.3)
MCH RBC QN AUTO: 27.1 PG (ref 26.6–33)
MCHC RBC AUTO-ENTMCNC: 32.9 G/DL (ref 31.5–35.7)
MCV RBC AUTO: 82.4 FL (ref 79–97)
MONOCYTES # BLD AUTO: 0.59 10*3/MM3 (ref 0.1–0.9)
MONOCYTES NFR BLD AUTO: 9.3 % (ref 5–12)
NEUTROPHILS NFR BLD AUTO: 3.46 10*3/MM3 (ref 1.7–7)
NEUTROPHILS NFR BLD AUTO: 54.3 % (ref 42.7–76)
NRBC BLD AUTO-RTO: 0 /100 WBC (ref 0–0.2)
PLATELET # BLD AUTO: 376 10*3/MM3 (ref 140–450)
PMV BLD AUTO: 10.4 FL (ref 6–12)
POTASSIUM SERPL-SCNC: 4.2 MMOL/L (ref 3.5–5.2)
PROT SERPL-MCNC: 7.2 G/DL (ref 6–8.5)
RBC # BLD AUTO: 5.06 10*6/MM3 (ref 3.77–5.28)
SODIUM SERPL-SCNC: 139 MMOL/L (ref 136–145)
TRIGL SERPL-MCNC: 117 MG/DL (ref 0–150)
TSH SERPL DL<=0.05 MIU/L-ACNC: 1.97 UIU/ML (ref 0.27–4.2)
VIT B12 BLD-MCNC: 289 PG/ML (ref 211–946)
VLDLC SERPL-MCNC: 21 MG/DL (ref 5–40)
WBC NRBC COR # BLD: 6.37 10*3/MM3 (ref 3.4–10.8)

## 2023-08-15 PROCEDURE — 80053 COMPREHEN METABOLIC PANEL: CPT | Performed by: FAMILY MEDICINE

## 2023-08-15 PROCEDURE — 83036 HEMOGLOBIN GLYCOSYLATED A1C: CPT | Performed by: FAMILY MEDICINE

## 2023-08-15 PROCEDURE — 85025 COMPLETE CBC W/AUTO DIFF WBC: CPT | Performed by: FAMILY MEDICINE

## 2023-08-15 PROCEDURE — 80061 LIPID PANEL: CPT | Performed by: FAMILY MEDICINE

## 2023-08-15 PROCEDURE — 82306 VITAMIN D 25 HYDROXY: CPT | Performed by: FAMILY MEDICINE

## 2023-08-15 PROCEDURE — 82607 VITAMIN B-12: CPT | Performed by: FAMILY MEDICINE

## 2023-08-15 PROCEDURE — 84443 ASSAY THYROID STIM HORMONE: CPT | Performed by: FAMILY MEDICINE

## 2023-08-16 NOTE — PROGRESS NOTES
Patient had no voice mail and she viewed her results on My Chart/ I sent her a message thru My Chart

## 2023-10-05 ENCOUNTER — OFFICE VISIT (OUTPATIENT)
Dept: FAMILY MEDICINE CLINIC | Facility: CLINIC | Age: 23
End: 2023-10-05

## 2023-10-05 VITALS
RESPIRATION RATE: 16 BRPM | WEIGHT: 194.8 LBS | TEMPERATURE: 97.3 F | HEART RATE: 77 BPM | BODY MASS INDEX: 35.85 KG/M2 | HEIGHT: 62 IN | SYSTOLIC BLOOD PRESSURE: 116 MMHG | OXYGEN SATURATION: 97 % | DIASTOLIC BLOOD PRESSURE: 80 MMHG

## 2023-10-05 DIAGNOSIS — T30.0 BURN, FIRST DEGREE: Primary | ICD-10-CM

## 2023-10-05 DIAGNOSIS — Z23 NEED FOR VACCINATION: ICD-10-CM

## 2023-10-05 RX ORDER — CEPHALEXIN 500 MG/1
500 CAPSULE ORAL 2 TIMES DAILY
Qty: 14 CAPSULE | Refills: 0 | Status: SHIPPED | OUTPATIENT
Start: 2023-10-05 | End: 2023-10-12

## 2023-10-05 NOTE — PROGRESS NOTES
Subjective   Alpesh Nicholas is a 23 y.o. female.     History of Present Illness  33-year-old female patient present with complaint of accidental burn on right breast from curling iron.  Symptoms noted a week.  She is using Silvadene topical cream but she is stated that she is noticing some greenish discharge and concern about superficial skin infection.  Patient is stated there was no blister but she is using bandage after applying Silvadene.     The following portions of the patient's history were reviewed and updated as appropriate: past medical history, past social history, past surgical history and problem list.    Review of Systems   Constitutional:  Negative for fever.   Skin:         On the skin of right breast     Objective   Physical Exam  Vitals reviewed.   Skin:     Comments: First-degree right breast skin the some redness but no discharge.     Vitals:    10/05/23 0922   BP: 116/80   Pulse: 77   Resp: 16   Temp: 97.3 °F (36.3 °C)   SpO2: 97%     Current Outpatient Medications on File Prior to Visit   Medication Sig Dispense Refill    fexofenadine (Allegra Allergy) 180 MG tablet Take 1 tablet by mouth Daily. 30 tablet 0    fluticasone (FLONASE) 50 MCG/ACT nasal spray 2 sprays into the nostril(s) as directed by provider Daily. 16 g 3    silver sulfadiazine (Silvadene) 1 % cream Apply 1 application  topically to the appropriate area as directed 2 (Two) Times a Day. 50 g 0    [DISCONTINUED] citalopram (CeleXA) 10 MG tablet Take 1 tablet by mouth Daily. (Patient not taking: Reported on 10/5/2023) 90 tablet 0     No current facility-administered medications on file prior to visit.           Assessment & Plan   Problems Addressed this Visit          Infectious Diseases    Need for vaccination    Relevant Orders    Fluzone (or Fluarix & Flulaval for VFC) >6mos (Completed)       Musculoskeletal and Injuries    Burn, first degree with curling iron on right breast - Primary    Relevant Medications    cephalexin  (KEFLEX) 500 MG capsule     Diagnoses         Codes Comments    Burn, first degree with curling iron on right breast    -  Primary ICD-10-CM: T30.0  ICD-9-CM: 949.1     Need for vaccination     ICD-10-CM: Z23  ICD-9-CM: V05.9

## 2023-10-27 ENCOUNTER — OFFICE VISIT (OUTPATIENT)
Dept: FAMILY MEDICINE CLINIC | Facility: CLINIC | Age: 23
End: 2023-10-27
Payer: MEDICAID

## 2023-10-27 VITALS
TEMPERATURE: 98 F | OXYGEN SATURATION: 96 % | HEART RATE: 89 BPM | BODY MASS INDEX: 35.92 KG/M2 | SYSTOLIC BLOOD PRESSURE: 108 MMHG | RESPIRATION RATE: 16 BRPM | HEIGHT: 62 IN | WEIGHT: 195.2 LBS | DIASTOLIC BLOOD PRESSURE: 77 MMHG

## 2023-10-27 DIAGNOSIS — N63.11 MASS OF UPPER OUTER QUADRANT OF RIGHT BREAST: Primary | ICD-10-CM

## 2023-10-27 PROCEDURE — 99213 OFFICE O/P EST LOW 20 MIN: CPT | Performed by: FAMILY MEDICINE

## 2023-10-27 RX ORDER — SPIRONOLACTONE 50 MG/1
50 TABLET, FILM COATED ORAL DAILY
COMMUNITY

## 2023-10-27 NOTE — PROGRESS NOTES
Subjective   Chief Complaint   Patient presents with    Breast Problem     Right breast lump on top x 2 days     Alpesh Nicholas is a 23 y.o. female.     Patient Care Team:  Joy Meadows MD as PCP - Silke Turner MD as Consulting Physician (Obstetrics and Gynecology)  Silvestre Calvin MD (Dermatology)    History of Present Illness  She is coming in today due to a right breast concerns.  She reports that couple of days she noted a small lump located in the upper part of the right breast which is tender to touch.  She feels that the lump has actually slightly come down in size since then.  She was started by her dermatologist in the recent past on spironolactone due to acne, after she noted a lump she decided to discontinue spironolactone as she was not sure if this was causing it.  She is not on birth control.       The following portions of the patient's history were reviewed and updated as appropriate: allergies, current medications, past family history, past medical history, past social history, past surgical history, and problem list.  Past Medical History:   Diagnosis Date    Allergic rhinitis     Arachnoid cyst     Concussion     Fatty liver     GERD (gastroesophageal reflux disease)      Past Surgical History:   Procedure Laterality Date    CYST REMOVAL      neck     The patient has a family history of  Family History   Problem Relation Age of Onset    Diabetes Maternal Grandmother     Diabetes Maternal Grandfather     Lung cancer Paternal Grandfather      Social History     Socioeconomic History    Marital status:    Tobacco Use    Smoking status: Never    Smokeless tobacco: Never   Vaping Use    Vaping Use: Never used   Substance and Sexual Activity    Alcohol use: Yes     Comment: occasionally    Drug use: No    Sexual activity: Defer       Review of Systems   Genitourinary:  Positive for breast lump and breast pain. Negative for breast discharge.     Visit Vitals  /77  "(BP Location: Left arm, Patient Position: Sitting, Cuff Size: Adult)   Pulse 89   Temp 98 °F (36.7 °C) (Infrared)   Resp 16   Ht 157.5 cm (62.01\")   Wt 88.5 kg (195 lb 3.2 oz)   LMP 10/04/2023 (Approximate)   SpO2 96%   BMI 35.69 kg/m²              Current Outpatient Medications:     fexofenadine (Allegra Allergy) 180 MG tablet, Take 1 tablet by mouth Daily., Disp: 30 tablet, Rfl: 0    fluticasone (FLONASE) 50 MCG/ACT nasal spray, 2 sprays into the nostril(s) as directed by provider Daily., Disp: 16 g, Rfl: 3    spironolactone (ALDACTONE) 50 MG tablet, Take 1 tablet by mouth Daily. Stopped 2 days ago and was prescribed by Derm 2 weeks ago, Disp: , Rfl:     Objective   Physical Exam  Constitutional:       General: She is not in acute distress.     Appearance: Normal appearance. She is well-developed. She is not ill-appearing or diaphoretic.      Comments: Patient is in no distress, patient has normal voice and speech.  Normal respiratory effort.   HENT:      Head: Normocephalic and atraumatic.   Pulmonary:      Effort: Pulmonary effort is normal.   Chest:      Comments: Right breast exams shows somehow lumpy tissue in the upper part of the breast.  The area is somehow tender on palpation, I do not necessarily appreciate any mass, which would stand out.  Musculoskeletal:      Cervical back: Normal range of motion and neck supple.   Neurological:      General: No focal deficit present.      Mental Status: She is alert and oriented to person, place, and time. Mental status is at baseline.   Psychiatric:         Mood and Affect: Mood normal.         Assessment & Plan   Diagnoses and all orders for this visit:    1. Mass of upper outer quadrant of right breast (Primary)  -     US Breast Right Limited; Future      I will be getting ultrasound of the right breast for further evaluation.  In meantime she may take over-the-counter Tylenol or ibuprofen for discomfort.  I will advise further once the result of the ultrasound is " available.      Return if symptoms worsen or fail to improve, for Recheck.    Requested Prescriptions      No prescriptions requested or ordered in this encounter

## 2023-11-02 ENCOUNTER — HOSPITAL ENCOUNTER (OUTPATIENT)
Dept: ULTRASOUND IMAGING | Facility: HOSPITAL | Age: 23
Discharge: HOME | End: 2023-11-02
Admitting: FAMILY MEDICINE
Payer: COMMERCIAL

## 2023-11-02 DIAGNOSIS — N63.11 MASS OF UPPER OUTER QUADRANT OF RIGHT BREAST: ICD-10-CM

## 2023-11-02 PROCEDURE — 76642 ULTRASOUND BREAST LIMITED: CPT

## 2024-05-13 ENCOUNTER — OFFICE VISIT (OUTPATIENT)
Dept: FAMILY MEDICINE CLINIC | Facility: CLINIC | Age: 24
End: 2024-05-13
Payer: COMMERCIAL

## 2024-05-13 VITALS
HEIGHT: 62 IN | HEART RATE: 89 BPM | OXYGEN SATURATION: 97 % | SYSTOLIC BLOOD PRESSURE: 112 MMHG | DIASTOLIC BLOOD PRESSURE: 77 MMHG | WEIGHT: 192 LBS | BODY MASS INDEX: 35.33 KG/M2 | TEMPERATURE: 98.2 F | RESPIRATION RATE: 18 BRPM

## 2024-05-13 DIAGNOSIS — H60.333 ACUTE SWIMMER'S EAR OF BOTH SIDES: Primary | ICD-10-CM

## 2024-05-13 PROCEDURE — 99213 OFFICE O/P EST LOW 20 MIN: CPT | Performed by: FAMILY MEDICINE

## 2024-05-13 RX ORDER — PREDNISOLONE ACETATE 10 MG/ML
SUSPENSION/ DROPS OPHTHALMIC AS NEEDED
COMMUNITY
Start: 2024-02-24

## 2024-05-13 RX ORDER — CIPROFLOXACIN AND DEXAMETHASONE 3; 1 MG/ML; MG/ML
4 SUSPENSION/ DROPS AURICULAR (OTIC) 2 TIMES DAILY
Qty: 7.5 ML | Refills: 0 | Status: SHIPPED | OUTPATIENT
Start: 2024-05-13

## 2024-05-13 NOTE — PROGRESS NOTES
Subjective   Chief Complaint   Patient presents with    Ear Fullness    Earache     Alpesh Nicholas is a 23 y.o. female.     Patient Care Team:  Joy Meadows MD as PCP - Slike Turner MD as Consulting Physician (Obstetrics and Gynecology)  Silvestre Calvin MD (Dermatology)    History of Present Illness  She is coming in today due to bilateral ear pain which she has been experiencing over the last 4 days or so.  The right 1 feels worse than the left 1.  She noted that pressure on the ear makes the discomfort worse.  No hearing loss, no drainage.  No upper respiratory symptoms reported.       The following portions of the patient's history were reviewed and updated as appropriate: allergies, current medications, past family history, past medical history, past social history, past surgical history, and problem list.  Past Medical History:   Diagnosis Date    Allergic rhinitis     Arachnoid cyst     Concussion     Fatty liver     GERD (gastroesophageal reflux disease)      Past Surgical History:   Procedure Laterality Date    CYST REMOVAL      neck     The patient has a family history of  Family History   Problem Relation Age of Onset    Diabetes Maternal Grandmother     Diabetes Maternal Grandfather     Lung cancer Paternal Grandfather      Social History     Socioeconomic History    Marital status:    Tobacco Use    Smoking status: Never    Smokeless tobacco: Never   Vaping Use    Vaping status: Never Used   Substance and Sexual Activity    Alcohol use: Yes     Comment: occasionally    Drug use: No    Sexual activity: Defer       Review of Systems   Constitutional:  Negative for activity change, appetite change, chills and fever.   HENT:  Positive for ear pain. Negative for congestion, postnasal drip, sinus pressure, sore throat and swollen glands.    Respiratory:  Negative for cough, choking, chest tightness, shortness of breath, wheezing and stridor.    Cardiovascular:  Negative for  "chest pain.   Skin:  Negative for dry skin and rash.     Visit Vitals  /77 (BP Location: Left arm, Patient Position: Sitting, Cuff Size: Large Adult)   Pulse 89   Temp 98.2 °F (36.8 °C) (Infrared)   Resp 18   Ht 157.5 cm (62\")   Wt 87.1 kg (192 lb)   LMP 04/29/2024 (Approximate)   SpO2 97%   BMI 35.12 kg/m²       Class 2 Severe Obesity (BMI >=35 and <=39.9). Obesity-related health conditions include the following: none. Obesity is unchanged. BMI is is above average; BMI management plan is completed. We discussed portion control and increasing exercise.      Current Outpatient Medications:     fexofenadine (Allegra Allergy) 180 MG tablet, Take 1 tablet by mouth Daily. (Patient taking differently: Take 1 tablet by mouth As Needed (as needed).), Disp: 30 tablet, Rfl: 0    fluticasone (FLONASE) 50 MCG/ACT nasal spray, 2 sprays into the nostril(s) as directed by provider Daily., Disp: 16 g, Rfl: 3    prednisoLONE acetate (PRED FORTE) 1 % ophthalmic suspension, As Needed (as needed)., Disp: , Rfl:     ciprofloxacin-dexAMETHasone (Ciprodex) 0.3-0.1 % otic suspension, Administer 4 drops into both ears 2 (Two) Times a Day., Disp: 7.5 mL, Rfl: 0    spironolactone (ALDACTONE) 50 MG tablet, Take 1 tablet by mouth Daily. Stopped 2 days ago and was prescribed by Derm 2 weeks ago (Patient not taking: Reported on 5/13/2024), Disp: , Rfl:     Objective   Physical Exam  Vitals and nursing note reviewed.   Constitutional:       General: She is not in acute distress.     Appearance: She is well-developed.   HENT:      Head: Normocephalic and atraumatic.      Comments: There is some redness and inflammation noted in both ear canals, worse on the right.     Right Ear: External ear normal.      Left Ear: External ear normal.      Mouth/Throat:      Pharynx: No oropharyngeal exudate.   Eyes:      Conjunctiva/sclera: Conjunctivae normal.      Pupils: Pupils are equal, round, and reactive to light.   Cardiovascular:      Rate and " Rhythm: Normal rate and regular rhythm.      Heart sounds: Normal heart sounds.   Pulmonary:      Effort: Pulmonary effort is normal. No respiratory distress.      Breath sounds: Normal breath sounds. No wheezing or rales.   Musculoskeletal:      Cervical back: Normal range of motion and neck supple.   Skin:     General: Skin is warm and dry.      Findings: No rash.         Assessment & Plan   Diagnoses and all orders for this visit:    1. Acute swimmer's ear of both sides (Primary)  -     ciprofloxacin-dexAMETHasone (Ciprodex) 0.3-0.1 % otic suspension; Administer 4 drops into both ears 2 (Two) Times a Day.  Dispense: 7.5 mL; Refill: 0      I will be starting her on eardrops, patient was instructed on how to use the drops.  She is to monitor the symptoms and contact us back if no improvement.      Return if symptoms worsen or fail to improve, for Recheck.    Requested Prescriptions     Signed Prescriptions Disp Refills    ciprofloxacin-dexAMETHasone (Ciprodex) 0.3-0.1 % otic suspension 7.5 mL 0     Sig: Administer 4 drops into both ears 2 (Two) Times a Day.       Joy Meadows MD  05/13/2024

## 2024-10-16 ENCOUNTER — OFFICE VISIT (OUTPATIENT)
Dept: FAMILY MEDICINE CLINIC | Facility: CLINIC | Age: 24
End: 2024-10-16
Payer: COMMERCIAL

## 2024-10-16 VITALS
OXYGEN SATURATION: 96 % | HEIGHT: 62 IN | HEART RATE: 88 BPM | TEMPERATURE: 98.2 F | SYSTOLIC BLOOD PRESSURE: 130 MMHG | BODY MASS INDEX: 35.28 KG/M2 | DIASTOLIC BLOOD PRESSURE: 84 MMHG | WEIGHT: 191.7 LBS | RESPIRATION RATE: 18 BRPM

## 2024-10-16 DIAGNOSIS — Z23 NEED FOR VACCINATION: ICD-10-CM

## 2024-10-16 DIAGNOSIS — J01.00 ACUTE MAXILLARY SINUSITIS, RECURRENCE NOT SPECIFIED: Primary | ICD-10-CM

## 2024-10-16 LAB
EXPIRATION DATE: NORMAL
EXPIRATION DATE: NORMAL
INTERNAL CONTROL: NORMAL
INTERNAL CONTROL: NORMAL
Lab: NORMAL
Lab: NORMAL
SARS-COV-2 AG UPPER RESP QL IA.RAPID: NOT DETECTED
SARS-COV-2 AG UPPER RESP QL IA.RAPID: NOT DETECTED

## 2024-10-16 PROCEDURE — 90656 IIV3 VACC NO PRSV 0.5 ML IM: CPT | Performed by: FAMILY MEDICINE

## 2024-10-16 PROCEDURE — 99213 OFFICE O/P EST LOW 20 MIN: CPT | Performed by: FAMILY MEDICINE

## 2024-10-16 PROCEDURE — 90471 IMMUNIZATION ADMIN: CPT | Performed by: FAMILY MEDICINE

## 2024-10-16 PROCEDURE — 87426 SARSCOV CORONAVIRUS AG IA: CPT | Performed by: FAMILY MEDICINE

## 2024-10-16 RX ORDER — PRENATAL VIT/IRON FUM/FOLIC AC 27MG-0.8MG
1 TABLET ORAL DAILY
COMMUNITY

## 2024-10-16 RX ORDER — AZITHROMYCIN 250 MG/1
TABLET, FILM COATED ORAL
Qty: 6 TABLET | Refills: 0 | Status: SHIPPED | OUTPATIENT
Start: 2024-10-16

## 2024-10-16 RX ORDER — AZITHROMYCIN 250 MG/1
TABLET, FILM COATED ORAL
Qty: 6 TABLET | Refills: 0 | Status: SHIPPED | OUTPATIENT
Start: 2024-10-16 | End: 2024-10-16 | Stop reason: SDUPTHER

## 2024-10-16 NOTE — PROGRESS NOTES
Subjective   Chief Complaint   Patient presents with    Sinusitis     Sore throat and congestion and ears hurts, mucus turning green. Started Sunday.      Alpesh Nicholas is a 24 y.o. female.     Patient Care Team:  Joy Meadows MD as PCP - General  Silke Smith MD as Consulting Physician (Obstetrics and Gynecology)  Silvestre Calvin MD (Dermatology)    History of Present Illness  She is coming in today due to upper respiratory symptoms which she has been experiencing over the last 4 days or so.  It started with some drainage in the back of the throat and some sore throat, now she feels like it all moved into her sinuses and she is concerned that she might have sinus infection.  No fever or chills or pain reported.  No chest pains or difficulty breathing.       The following portions of the patient's history were reviewed and updated as appropriate: allergies, current medications, past family history, past medical history, past social history, past surgical history, and problem list.  Past Medical History:   Diagnosis Date    Allergic rhinitis     Arachnoid cyst     Concussion     Fatty liver     GERD (gastroesophageal reflux disease)      Past Surgical History:   Procedure Laterality Date    CYST REMOVAL      neck     The patient has a family history of  Family History   Problem Relation Age of Onset    Diabetes Maternal Grandmother     Diabetes Maternal Grandfather     Lung cancer Paternal Grandfather      Social History     Socioeconomic History    Marital status:    Tobacco Use    Smoking status: Never     Passive exposure: Never    Smokeless tobacco: Never   Vaping Use    Vaping status: Never Used   Substance and Sexual Activity    Alcohol use: Yes     Comment: occasionally    Drug use: No    Sexual activity: Defer       Review of Systems   Constitutional:  Negative for activity change, appetite change, chills and fever.   HENT:  Positive for congestion, postnasal drip, sinus pressure and  "sore throat. Negative for ear pain and swollen glands.    Respiratory:  Negative for cough, choking, chest tightness, shortness of breath, wheezing and stridor.    Cardiovascular:  Negative for chest pain.   Skin:  Negative for dry skin and rash.     Visit Vitals  /84 (BP Location: Left arm, Patient Position: Sitting, Cuff Size: Adult)   Pulse 88   Temp 98.2 °F (36.8 °C) (Temporal)   Resp 18   Ht 157.5 cm (62\")   Wt 87 kg (191 lb 11.2 oz)   LMP 10/02/2024   SpO2 96%   BMI 35.06 kg/m²              Current Outpatient Medications:     azithromycin (Zithromax Z-Jere) 250 MG tablet, Take 2 tablets by mouth on day 1, then 1 tablet daily on days 2-5, Disp: 6 tablet, Rfl: 0    fluticasone (FLONASE) 50 MCG/ACT nasal spray, 2 sprays into the nostril(s) as directed by provider Daily., Disp: 16 g, Rfl: 3    Prenatal Vit-Fe Fumarate-FA (prenatal vitamin 27-0.8) 27-0.8 MG tablet tablet, Take 1 tablet by mouth Daily., Disp: , Rfl:     Objective   Physical Exam  Vitals and nursing note reviewed.   Constitutional:       General: She is not in acute distress.     Appearance: She is well-developed.   HENT:      Head: Normocephalic and atraumatic.      Right Ear: Tympanic membrane and external ear normal.      Left Ear: Tympanic membrane and external ear normal.      Nose: Congestion present.      Mouth/Throat:      Pharynx: Posterior oropharyngeal erythema present. No oropharyngeal exudate.   Eyes:      Conjunctiva/sclera: Conjunctivae normal.      Pupils: Pupils are equal, round, and reactive to light.   Cardiovascular:      Rate and Rhythm: Normal rate and regular rhythm.      Heart sounds: Normal heart sounds.   Pulmonary:      Effort: Pulmonary effort is normal. No respiratory distress.      Breath sounds: Normal breath sounds. No wheezing or rales.   Musculoskeletal:      Cervical back: Normal range of motion and neck supple.   Skin:     General: Skin is warm and dry.      Findings: No rash.       Assessment & Plan "   Diagnoses and all orders for this visit:    1. Acute maxillary sinusitis, recurrence not specified (Primary)  -     POCT ALY SARS-CoV-2 Antigen MENDEL  -     Discontinue: azithromycin (Zithromax Z-Jere) 250 MG tablet; Take 2 tablets by mouth on day 1, then 1 tablet daily on days 2-5  Dispense: 6 tablet; Refill: 0  -     azithromycin (Zithromax Z-Jere) 250 MG tablet; Take 2 tablets by mouth on day 1, then 1 tablet daily on days 2-5  Dispense: 6 tablet; Refill: 0    2. Need for vaccination  -     Fluzone >6mos (1700-1069)        Her rapid COVID-19 test was negative today.  I will be starting her on Z-Jere, she may continue over-the-counter medications as needed for symptom control and monitor the symptoms and contact us back if any questions or concerns.    No follow-ups on file.    Requested Prescriptions     Signed Prescriptions Disp Refills    azithromycin (Zithromax Z-Jere) 250 MG tablet 6 tablet 0     Sig: Take 2 tablets by mouth on day 1, then 1 tablet daily on days 2-5       Joy Meadows MD  10/16/2024

## 2024-11-03 RX ORDER — CITALOPRAM HYDROBROMIDE 10 MG/1
10 TABLET ORAL DAILY
Qty: 90 TABLET | Refills: 1 | Status: SHIPPED | OUTPATIENT
Start: 2024-11-03

## 2025-01-10 RX ORDER — CITALOPRAM HYDROBROMIDE 10 MG/1
10 TABLET ORAL DAILY
Qty: 90 TABLET | Refills: 0 | Status: SHIPPED | OUTPATIENT
Start: 2025-01-10

## 2025-01-17 RX ORDER — CITALOPRAM HYDROBROMIDE 10 MG/1
10 TABLET ORAL DAILY
Qty: 90 TABLET | Refills: 1 | Status: SHIPPED | OUTPATIENT
Start: 2025-01-17

## 2025-01-17 NOTE — TELEPHONE ENCOUNTER
Caller: Alpesh Nicholas    Relationship: Self    Best call back number:[phone]     Requested Prescriptions:   Requested Prescriptions     Pending Prescriptions Disp Refills    citalopram (CeleXA) 10 MG tablet 90 tablet 0     Sig: Take 1 tablet by mouth Daily.        Pharmacy where request should be sent: [id number] - [facility] [address] - [phone]  - [phone] FX     Last office visit with prescribing clinician: 10/16/2024   Last telemedicine visit with prescribing clinician: Visit date not found   Next office visit with prescribing clinician: Visit date not found     Additional details provided by patient: PLEASE RESEND TO THE ABOVE PHARMACY    Does the patient have less than a 3 day supply:  [x] Yes  [] No    Would you like a call back once the refill request has been completed: [] Yes [] No    If the office needs to give you a call back, can they leave a voicemail: [] Yes [] No    Den Sanabria Rep   01/17/25 12:14 EST

## 2025-02-11 ENCOUNTER — OFFICE VISIT (OUTPATIENT)
Dept: FAMILY MEDICINE CLINIC | Facility: CLINIC | Age: 25
End: 2025-02-11

## 2025-02-11 VITALS
HEART RATE: 88 BPM | RESPIRATION RATE: 15 BRPM | HEIGHT: 62 IN | BODY MASS INDEX: 35.19 KG/M2 | SYSTOLIC BLOOD PRESSURE: 115 MMHG | DIASTOLIC BLOOD PRESSURE: 81 MMHG | OXYGEN SATURATION: 96 % | TEMPERATURE: 98.2 F | WEIGHT: 191.2 LBS

## 2025-02-11 DIAGNOSIS — L73.9 FOLLICULITIS: Primary | ICD-10-CM

## 2025-02-11 PROBLEM — N63.11 MASS OF UPPER OUTER QUADRANT OF RIGHT BREAST: Status: RESOLVED | Noted: 2023-10-27 | Resolved: 2025-02-11

## 2025-02-11 PROBLEM — Z23 NEED FOR VACCINATION: Status: RESOLVED | Noted: 2021-10-01 | Resolved: 2025-02-11

## 2025-02-11 PROBLEM — T30.0 BURN, FIRST DEGREE: Status: RESOLVED | Noted: 2023-10-05 | Resolved: 2025-02-11

## 2025-02-11 PROBLEM — J01.00 ACUTE MAXILLARY SINUSITIS: Status: RESOLVED | Noted: 2019-11-07 | Resolved: 2025-02-11

## 2025-02-11 PROBLEM — R05.2 SUBACUTE COUGH: Status: RESOLVED | Noted: 2023-04-06 | Resolved: 2025-02-11

## 2025-02-11 PROBLEM — R09.89 TONSIL SYMPTOM: Status: RESOLVED | Noted: 2021-07-09 | Resolved: 2025-02-11

## 2025-02-11 PROBLEM — H60.333 ACUTE SWIMMER'S EAR OF BOTH SIDES: Status: RESOLVED | Noted: 2024-05-13 | Resolved: 2025-02-11

## 2025-02-11 PROCEDURE — 99213 OFFICE O/P EST LOW 20 MIN: CPT | Performed by: FAMILY MEDICINE

## 2025-02-11 RX ORDER — SULFAMETHOXAZOLE AND TRIMETHOPRIM 800; 160 MG/1; MG/1
1 TABLET ORAL 2 TIMES DAILY
Qty: 14 TABLET | Refills: 0 | Status: SHIPPED | OUTPATIENT
Start: 2025-02-11 | End: 2025-02-18

## 2025-02-11 NOTE — PROGRESS NOTES
"Subjective   Chief Complaint   Patient presents with    Mass     X1 day Swollen lump in left armpit. Hurts to touch, red     Alpesh Nicholas is a 24 y.o. female.     Patient Care Team:  Joy Meadows MD as PCP - Silke Turner MD as Consulting Physician (Obstetrics and Gynecology)  Silvestre Calvin MD (Dermatology)    History of Present Illness  She is coming in today due to a painful lump she noted in her left armpit yesterday.  It is red and tender to touch.  No fever or chills reported.  No drainage or bleeding.  Patient typically shaves her armpits every 2 to 3 days.       The following portions of the patient's history were reviewed and updated as appropriate: allergies, current medications, past family history, past medical history, past social history, past surgical history, and problem list.  Past Medical History:   Diagnosis Date    Allergic rhinitis     Arachnoid cyst     Concussion     Fatty liver     GERD (gastroesophageal reflux disease)      Past Surgical History:   Procedure Laterality Date    CYST REMOVAL      neck     The patient has a family history of  Family History   Problem Relation Age of Onset    Diabetes Maternal Grandmother     Diabetes Maternal Grandfather     Lung cancer Paternal Grandfather      Social History     Socioeconomic History    Marital status:    Tobacco Use    Smoking status: Never     Passive exposure: Never    Smokeless tobacco: Never   Vaping Use    Vaping status: Never Used   Substance and Sexual Activity    Alcohol use: Yes     Comment: occasionally    Drug use: No    Sexual activity: Defer       Review of Systems   Constitutional:  Negative for chills and fever.   Skin:  Positive for color change and skin lesions.     Visit Vitals  /81 (BP Location: Right arm, Patient Position: Sitting, Cuff Size: Adult)   Pulse 88   Temp 98.2 °F (36.8 °C) (Infrared)   Resp 15   Ht 157.5 cm (62.01\")   Wt 86.7 kg (191 lb 3.2 oz)   SpO2 96%   BMI 34.96 " kg/m²              Current Outpatient Medications:     citalopram (CeleXA) 10 MG tablet, Take 1 tablet by mouth Daily., Disp: 90 tablet, Rfl: 1    fluticasone (FLONASE) 50 MCG/ACT nasal spray, 2 sprays into the nostril(s) as directed by provider Daily., Disp: 16 g, Rfl: 3    Prenatal Vit-Fe Fumarate-FA (prenatal vitamin 27-0.8) 27-0.8 MG tablet tablet, Take 1 tablet by mouth Daily., Disp: , Rfl:     sulfamethoxazole-trimethoprim (Bactrim DS) 800-160 MG per tablet, Take 1 tablet by mouth 2 (Two) Times a Day for 7 days., Disp: 14 tablet, Rfl: 0    Objective   Physical Exam  Constitutional:       General: She is not in acute distress.     Appearance: Normal appearance. She is well-developed. She is not ill-appearing or diaphoretic.      Comments: Patient is in no distress, patient has normal voice and speech.  Normal respiratory effort.   HENT:      Head: Normocephalic and atraumatic.   Pulmonary:      Effort: Pulmonary effort is normal.   Musculoskeletal:      Cervical back: Normal range of motion and neck supple.   Skin:     Comments: There is a small subcutaneous tissue swelling/lump in the left axilla, it is about 1 cm in diameter, it is red, it is tender to touch and slightly warm to touch.  No fluctuation.  No drainage.   Neurological:      General: No focal deficit present.      Mental Status: She is alert and oriented to person, place, and time. Mental status is at baseline.   Psychiatric:         Mood and Affect: Mood normal.         Assessment & Plan   Diagnoses and all orders for this visit:    1. Folliculitis (Primary)  -     sulfamethoxazole-trimethoprim (Bactrim DS) 800-160 MG per tablet; Take 1 tablet by mouth 2 (Two) Times a Day for 7 days.  Dispense: 14 tablet; Refill: 0        I will be starting her on antibiotic.  Also advised the patient to not shave the area for now until this is healed and then start using a new blade for the shaving.  She was also advised to closely monitor and contact us back if  there is no complete resolution with antibiotic.      No follow-ups on file.    Requested Prescriptions     Signed Prescriptions Disp Refills    sulfamethoxazole-trimethoprim (Bactrim DS) 800-160 MG per tablet 14 tablet 0     Sig: Take 1 tablet by mouth 2 (Two) Times a Day for 7 days.       Joy Meadows MD  02/11/2025  11:55 EST

## 2025-04-23 ENCOUNTER — OFFICE VISIT (OUTPATIENT)
Dept: FAMILY MEDICINE CLINIC | Facility: CLINIC | Age: 25
End: 2025-04-23
Payer: COMMERCIAL

## 2025-04-23 VITALS
HEIGHT: 62 IN | OXYGEN SATURATION: 97 % | TEMPERATURE: 98.2 F | BODY MASS INDEX: 34.01 KG/M2 | HEART RATE: 85 BPM | DIASTOLIC BLOOD PRESSURE: 79 MMHG | WEIGHT: 184.8 LBS | RESPIRATION RATE: 15 BRPM | SYSTOLIC BLOOD PRESSURE: 115 MMHG

## 2025-04-23 DIAGNOSIS — E88.819 INSULIN RESISTANCE: Primary | ICD-10-CM

## 2025-04-23 DIAGNOSIS — E53.8 VITAMIN B12 DEFICIENCY: ICD-10-CM

## 2025-04-23 DIAGNOSIS — E55.9 VITAMIN D DEFICIENCY: ICD-10-CM

## 2025-04-23 PROCEDURE — 99213 OFFICE O/P EST LOW 20 MIN: CPT | Performed by: FAMILY MEDICINE

## 2025-04-23 RX ORDER — SULFACETAMIDE SODIUM AND SULFUR 9; 4.5 MG/G; MG/G
RINSE TOPICAL 2 TIMES DAILY
COMMUNITY
Start: 2025-02-14 | End: 2025-04-23

## 2025-04-23 NOTE — PROGRESS NOTES
Subjective   Chief Complaint   Patient presents with    Labs Only     Bloodwork review     Alpesh Nicholas is a 24 y.o. female.     Patient Care Team:  Joy Meadows MD as PCP -   LuisSilke MD as Consulting Physician (Obstetrics and Gynecology)  Silvestre Calvin MD (Dermatology)    History of Present Illness  She is coming in today to review and discuss some recent blood work she has done through a local nurse practitioner.  Patient had a detailed panel of multiple labs, she tells me that she was given recommendation to take some supplements and even start hormones, but she did not feel comfortable with these recommendations and would like to review these results today.  She brought a hard copy of these results.  She reports that her menstrual cycle is very regular and she has never had issue with that.  She is , she would like to get pregnant down the road maybe in a year or so.       The following portions of the patient's history were reviewed and updated as appropriate: allergies, current medications, past family history, past medical history, past social history, past surgical history, and problem list.  Past Medical History:   Diagnosis Date    Allergic     Allergic rhinitis     Arachnoid cyst     Concussion     Depression     Fatty liver     GERD (gastroesophageal reflux disease)     Shin splints August 2015    Had them in high school while playing soccer     Past Surgical History:   Procedure Laterality Date    CYST REMOVAL      neck     The patient has a family history of  Family History   Problem Relation Age of Onset    Diabetes Maternal Grandmother     Diabetes Maternal Grandfather     Lung cancer Paternal Grandfather     Alcohol abuse Father     Cancer Paternal Grandmother         Cervical cancer     Social History     Socioeconomic History    Marital status:    Tobacco Use    Smoking status: Never     Passive exposure: Never    Smokeless tobacco: Never   Vaping  "Use    Vaping status: Never Used   Substance and Sexual Activity    Alcohol use: Yes     Comment: occasionally    Drug use: No    Sexual activity: Defer       Review of Systems   Constitutional:  Negative for activity change, fatigue and fever.   Respiratory:  Negative for shortness of breath and wheezing.    Cardiovascular:  Negative for chest pain, palpitations and leg swelling.   Musculoskeletal:  Negative for arthralgias and back pain.   Skin:  Negative for rash.   Neurological:  Negative for tremors and headache.     Visit Vitals  /79 (BP Location: Left arm, Patient Position: Sitting, Cuff Size: Adult)   Pulse 85   Temp 98.2 °F (36.8 °C) (Infrared)   Resp 15   Ht 157.5 cm (62.01\")   Wt 83.8 kg (184 lb 12.8 oz)   LMP 03/29/2025   SpO2 97%   BMI 33.79 kg/m²              Current Outpatient Medications:     cephalexin (KEFLEX) 500 MG capsule, Take 1 capsule by mouth 3 (Three) Times a Day for 7 days., Disp: 21 capsule, Rfl: 0    fluticasone (FLONASE) 50 MCG/ACT nasal spray, 2 sprays into the nostril(s) as directed by provider Daily., Disp: 16 g, Rfl: 3    Prenatal Vit-Fe Fumarate-FA (prenatal vitamin 27-0.8) 27-0.8 MG tablet tablet, Take 1 tablet by mouth Daily., Disp: , Rfl:     Objective   Physical Exam  Constitutional:       General: She is not in acute distress.     Appearance: Normal appearance. She is well-developed. She is not ill-appearing or diaphoretic.      Comments: Patient is in no distress, patient has normal voice and speech.  Normal respiratory effort.   HENT:      Head: Normocephalic and atraumatic.   Pulmonary:      Effort: Pulmonary effort is normal.   Musculoskeletal:      Cervical back: Normal range of motion and neck supple.   Neurological:      General: No focal deficit present.      Mental Status: She is alert and oriented to person, place, and time. Mental status is at baseline.   Psychiatric:         Mood and Affect: Mood normal.             Assessment & Plan   Diagnoses and all " orders for this visit:    1. Insulin resistance (Primary)    2. Vitamin B12 deficiency    3. Vitamin D deficiency        I reviewed the blood work results which patient provided today.  A copy of the report will be scanned into her media section of the chart.  The blood work included CBC, CMP, fasting lipid panel, thyroid labs and hemoglobin A1c and multiple other labs.  I advised the patient to start taking vitamin B12 1000 mcg a day and vitamin D3 2,000 units a day.  I also advised the patient to make an appointment with her gynecologist to reviewed the results and address her questions.  Healthy lifestyle was also discussed and recommended including regular exercises and weight loss.  I discussed with the patient that insulin resistance can potentially lead to diabetes in the future.      Return if symptoms worsen or fail to improve, for Recheck.    Requested Prescriptions      No prescriptions requested or ordered in this encounter       Joy Meadows MD  04/23/2025  08:43 EDT